# Patient Record
Sex: FEMALE | Race: WHITE | NOT HISPANIC OR LATINO | Employment: OTHER | URBAN - METROPOLITAN AREA
[De-identification: names, ages, dates, MRNs, and addresses within clinical notes are randomized per-mention and may not be internally consistent; named-entity substitution may affect disease eponyms.]

---

## 2017-01-11 ENCOUNTER — HOSPITAL ENCOUNTER (OUTPATIENT)
Dept: PHYSICAL THERAPY | Facility: MEDICAL CENTER | Age: 62
End: 2017-01-11
Attending: PHYSICAL MEDICINE & REHABILITATION
Payer: COMMERCIAL

## 2017-01-11 PROCEDURE — 97162 PT EVAL MOD COMPLEX 30 MIN: CPT

## 2017-01-11 PROCEDURE — 97140 MANUAL THERAPY 1/> REGIONS: CPT

## 2017-01-11 PROCEDURE — 97012 MECHANICAL TRACTION THERAPY: CPT

## 2017-01-16 ENCOUNTER — HOSPITAL ENCOUNTER (OUTPATIENT)
Dept: PHYSICAL THERAPY | Facility: MEDICAL CENTER | Age: 62
End: 2017-01-16
Attending: PHYSICAL MEDICINE & REHABILITATION
Payer: COMMERCIAL

## 2017-01-16 PROCEDURE — 97140 MANUAL THERAPY 1/> REGIONS: CPT

## 2017-01-16 PROCEDURE — 97012 MECHANICAL TRACTION THERAPY: CPT

## 2017-01-16 PROCEDURE — 97110 THERAPEUTIC EXERCISES: CPT

## 2017-01-23 ENCOUNTER — HOSPITAL ENCOUNTER (OUTPATIENT)
Dept: PHYSICAL THERAPY | Facility: MEDICAL CENTER | Age: 62
End: 2017-01-23
Attending: PHYSICAL MEDICINE & REHABILITATION
Payer: COMMERCIAL

## 2017-01-23 PROCEDURE — 97140 MANUAL THERAPY 1/> REGIONS: CPT

## 2017-01-23 PROCEDURE — 97012 MECHANICAL TRACTION THERAPY: CPT

## 2017-01-23 PROCEDURE — 97110 THERAPEUTIC EXERCISES: CPT

## 2017-01-25 ENCOUNTER — HOSPITAL ENCOUNTER (OUTPATIENT)
Dept: PHYSICAL THERAPY | Facility: MEDICAL CENTER | Age: 62
End: 2017-01-25
Attending: PHYSICAL MEDICINE & REHABILITATION
Payer: COMMERCIAL

## 2017-01-25 PROCEDURE — 97012 MECHANICAL TRACTION THERAPY: CPT

## 2017-01-25 PROCEDURE — 97110 THERAPEUTIC EXERCISES: CPT

## 2017-01-25 PROCEDURE — 97140 MANUAL THERAPY 1/> REGIONS: CPT

## 2017-01-30 ENCOUNTER — APPOINTMENT (OUTPATIENT)
Dept: PHYSICAL THERAPY | Facility: MEDICAL CENTER | Age: 62
End: 2017-01-30
Attending: PHYSICAL MEDICINE & REHABILITATION
Payer: COMMERCIAL

## 2017-02-01 ENCOUNTER — APPOINTMENT (OUTPATIENT)
Dept: PHYSICAL THERAPY | Facility: MEDICAL CENTER | Age: 62
End: 2017-02-01
Attending: PHYSICAL MEDICINE & REHABILITATION
Payer: COMMERCIAL

## 2017-02-06 ENCOUNTER — APPOINTMENT (OUTPATIENT)
Dept: PHYSICAL THERAPY | Facility: MEDICAL CENTER | Age: 62
End: 2017-02-06
Attending: PHYSICAL MEDICINE & REHABILITATION
Payer: COMMERCIAL

## 2017-05-23 PROBLEM — D23.71 OTHER BENIGN NEOPLASM OF SKIN OF RIGHT LOWER LIMB, INCLUDING HIP: Status: ACTIVE | Noted: 2017-05-23

## 2017-06-06 PROBLEM — D49.2 NEOPLASM OF UNSPECIFIED BEHAVIOR OF BONE, SOFT TISSUE, AND SKIN: Status: RESOLVED | Noted: 2017-05-23 | Resolved: 2017-06-06

## 2017-07-25 ENCOUNTER — RX ONLY (OUTPATIENT)
Age: 62
Setting detail: RX ONLY
End: 2017-07-25

## 2017-07-26 ENCOUNTER — RX ONLY (OUTPATIENT)
Age: 62
Setting detail: RX ONLY
End: 2017-07-26

## 2017-08-30 ENCOUNTER — HOSPITAL ENCOUNTER (OUTPATIENT)
Dept: RADIOLOGY | Facility: MEDICAL CENTER | Age: 62
End: 2017-08-30
Attending: FAMILY MEDICINE
Payer: COMMERCIAL

## 2017-08-30 DIAGNOSIS — Z13.820 SCREENING FOR OSTEOPOROSIS: ICD-10-CM

## 2017-08-30 PROCEDURE — G0202 SCR MAMMO BI INCL CAD: HCPCS

## 2019-06-25 ENCOUNTER — APPOINTMENT (RX ONLY)
Dept: URBAN - METROPOLITAN AREA CLINIC 4 | Facility: CLINIC | Age: 64
Setting detail: DERMATOLOGY
End: 2019-06-25

## 2019-06-25 DIAGNOSIS — L82.1 OTHER SEBORRHEIC KERATOSIS: ICD-10-CM

## 2019-06-25 DIAGNOSIS — L57.8 OTHER SKIN CHANGES DUE TO CHRONIC EXPOSURE TO NONIONIZING RADIATION: ICD-10-CM

## 2019-06-25 DIAGNOSIS — L81.4 OTHER MELANIN HYPERPIGMENTATION: ICD-10-CM

## 2019-06-25 DIAGNOSIS — D22 MELANOCYTIC NEVI: ICD-10-CM

## 2019-06-25 DIAGNOSIS — D18.0 HEMANGIOMA: ICD-10-CM

## 2019-06-25 DIAGNOSIS — L57.0 ACTINIC KERATOSIS: ICD-10-CM

## 2019-06-25 DIAGNOSIS — Z71.89 OTHER SPECIFIED COUNSELING: ICD-10-CM

## 2019-06-25 PROBLEM — D22.5 MELANOCYTIC NEVI OF TRUNK: Status: ACTIVE | Noted: 2019-06-25

## 2019-06-25 PROBLEM — D18.01 HEMANGIOMA OF SKIN AND SUBCUTANEOUS TISSUE: Status: ACTIVE | Noted: 2019-06-25

## 2019-06-25 PROCEDURE — ? MEDICATION COUNSELING

## 2019-06-25 PROCEDURE — 17004 DESTROY PREMAL LESIONS 15/>: CPT

## 2019-06-25 PROCEDURE — ? COUNSELING

## 2019-06-25 PROCEDURE — ? ADDITIONAL NOTES

## 2019-06-25 PROCEDURE — ? PRESCRIPTION

## 2019-06-25 PROCEDURE — 99397 PER PM REEVAL EST PAT 65+ YR: CPT | Mod: 25

## 2019-06-25 PROCEDURE — ? PHOTODYNAMIC THERAPY COUNSELING

## 2019-06-25 PROCEDURE — ? LIQUID NITROGEN

## 2019-06-25 RX ORDER — IMIQUIMOD 50 MG/G
CREAM TOPICAL
Qty: 12 | Refills: 2 | Status: ERX | COMMUNITY
Start: 2019-06-25

## 2019-06-25 RX ADMIN — IMIQUIMOD: 50 CREAM TOPICAL at 00:00

## 2019-06-25 ASSESSMENT — LOCATION DETAILED DESCRIPTION DERM
LOCATION DETAILED: STERNAL NOTCH
LOCATION DETAILED: LEFT ANTIHELIX
LOCATION DETAILED: LEFT ULNAR DORSAL HAND
LOCATION DETAILED: UPPER STERNUM
LOCATION DETAILED: RIGHT ANTIHELIX
LOCATION DETAILED: LEFT INFERIOR MEDIAL MIDBACK
LOCATION DETAILED: INFERIOR THORACIC SPINE
LOCATION DETAILED: RIGHT DORSAL WRIST
LOCATION DETAILED: LEFT DORSAL MIDDLE METACARPOPHALANGEAL JOINT
LOCATION DETAILED: RIGHT PROXIMAL RADIAL DORSAL FOREARM
LOCATION DETAILED: LEFT ANTITRAGUS
LOCATION DETAILED: LEFT DISTAL DORSAL FOREARM
LOCATION DETAILED: RIGHT MEDIAL SUPERIOR CHEST
LOCATION DETAILED: RIGHT PROXIMAL DORSAL FOREARM
LOCATION DETAILED: LEFT CAVUM CONCHA
LOCATION DETAILED: RIGHT SUPERIOR HELIX
LOCATION DETAILED: RIGHT SUPERIOR MEDIAL MIDBACK
LOCATION DETAILED: RIGHT INFERIOR CENTRAL MALAR CHEEK
LOCATION DETAILED: RIGHT RADIAL DORSAL HAND
LOCATION DETAILED: LEFT PROXIMAL DORSAL FOREARM
LOCATION DETAILED: RIGHT DISTAL DORSAL FOREARM
LOCATION DETAILED: LEFT INFERIOR CENTRAL MALAR CHEEK
LOCATION DETAILED: LEFT RADIAL DORSAL HAND

## 2019-06-25 ASSESSMENT — LOCATION ZONE DERM
LOCATION ZONE: EAR
LOCATION ZONE: ARM
LOCATION ZONE: FACE
LOCATION ZONE: HAND
LOCATION ZONE: TRUNK

## 2019-06-25 ASSESSMENT — LOCATION SIMPLE DESCRIPTION DERM
LOCATION SIMPLE: LEFT CHEEK
LOCATION SIMPLE: UPPER BACK
LOCATION SIMPLE: LEFT LOWER BACK
LOCATION SIMPLE: RIGHT WRIST
LOCATION SIMPLE: RIGHT CHEEK
LOCATION SIMPLE: RIGHT LOWER BACK
LOCATION SIMPLE: LEFT EAR
LOCATION SIMPLE: RIGHT HAND
LOCATION SIMPLE: LEFT FOREARM
LOCATION SIMPLE: RIGHT FOREARM
LOCATION SIMPLE: RIGHT EAR
LOCATION SIMPLE: CHEST
LOCATION SIMPLE: LEFT HAND

## 2019-06-25 NOTE — PROCEDURE: LIQUID NITROGEN
Render Note In Bullet Format When Appropriate: No
Consent: The patient's consent was obtained including but not limited to risks of crusting, scabbing, blistering, scarring, darker or lighter pigmentary change, recurrence, incomplete removal and infection.
Post-Care Instructions: I reviewed with the patient in detail post-care instructions. Patient is to wear sunprotection, and avoid picking at any of the treated lesions. Pt may apply Vaseline  or Aquaphor to crusted or scabbing areas.
Detail Level: Detailed
Duration Of Freeze Thaw-Cycle (Seconds): 0

## 2019-06-25 NOTE — PROCEDURE: MEDICATION COUNSELING
Cimzia Pregnancy And Lactation Text: This medication crosses the placenta but can be considered safe in certain situations. Cimzia may be excreted in breast milk.
5-Fu Counseling: 5-Fluorouracil Counseling:  I discussed with the patient the risks of 5-fluorouracil including but not limited to erythema, scaling, itching, weeping, crusting, and pain.
Ketoconazole Pregnancy And Lactation Text: This medication is Pregnancy Category C and it isn't know if it is safe during pregnancy. It is also excreted in breast milk and breast feeding isn't recommended.
Methotrexate Counseling:  Patient counseled regarding adverse effects of methotrexate including but not limited to nausea, vomiting, abnormalities in liver function tests. Patients may develop mouth sores, rash, diarrhea, and abnormalities in blood counts. The patient understands that monitoring is required including LFT's and blood counts.  There is a rare possibility of scarring of the liver and lung problems that can occur when taking methotrexate. Persistent nausea, loss of appetite, pale stools, dark urine, cough, and shortness of breath should be reported immediately. Patient advised to discontinue methotrexate treatment at least three months before attempting to become pregnant.  I discussed the need for folate supplements while taking methotrexate.  These supplements can decrease side effects during methotrexate treatment. The patient verbalized understanding of the proper use and possible adverse effects of methotrexate.  All of the patient's questions and concerns were addressed.
Stelara Pregnancy And Lactation Text: This medication is Pregnancy Category B and is considered safe during pregnancy. It is unknown if this medication is excreted in breast milk.
Spironolactone Counseling: Patient advised regarding risks of diarrhea, abdominal pain, hyperkalemia, birth defects (for female patients), liver toxicity and renal toxicity. The patient may need blood work to monitor liver and kidney function and potassium levels while on therapy. The patient verbalized understanding of the proper use and possible adverse effects of spironolactone.  All of the patient's questions and concerns were addressed.
Rhofade Counseling: Rhofade is a topical medication which can decrease superficial blood flow where applied. Side effects are uncommon and include stinging, redness and allergic reactions.
Gabapentin Pregnancy And Lactation Text: This medication is Pregnancy Category C and isn't considered safe during pregnancy. It is excreted in breast milk.
Terbinafine Counseling: Patient counseling regarding adverse effects of terbinafine including but not limited to headache, diarrhea, rash, upset stomach, liver function test abnormalities, itching, taste/smell disturbance, nausea, abdominal pain, and flatulence.  There is a rare possibility of liver failure that can occur when taking terbinafine.  The patient understands that a baseline LFT and kidney function test may be required. The patient verbalized understanding of the proper use and possible adverse effects of terbinafine.  All of the patient's questions and concerns were addressed.
Cosentyx Counseling:  I discussed with the patient the risks of Cosentyx including but not limited to worsening of Crohn's disease, immunosuppression, allergic reactions and infections.  The patient understands that monitoring is required including a PPD at baseline and must alert us or the primary physician if symptoms of infection or other concerning signs are noted.
Clindamycin Pregnancy And Lactation Text: This medication can be used in pregnancy if certain situations. Clindamycin is also present in breast milk.
5-Fu Pregnancy And Lactation Text: This medication is Pregnancy Category X and contraindicated in pregnancy and in women who may become pregnant. It is unknown if this medication is excreted in breast milk.
Methotrexate Pregnancy And Lactation Text: This medication is Pregnancy Category X and is known to cause fetal harm. This medication is excreted in breast milk.
Griseofulvin Pregnancy And Lactation Text: This medication is Pregnancy Category X and is known to cause serious birth defects. It is unknown if this medication is excreted in breast milk but breast feeding should be avoided.
Doxycycline Counseling:  Patient counseled regarding possible photosensitivity and increased risk for sunburn.  Patient instructed to avoid sunlight, if possible.  When exposed to sunlight, patients should wear protective clothing, sunglasses, and sunscreen.  The patient was instructed to call the office immediately if the following severe adverse effects occur:  hearing changes, easy bruising/bleeding, severe headache, or vision changes.  The patient verbalized understanding of the proper use and possible adverse effects of doxycycline.  All of the patient's questions and concerns were addressed.
Prednisone Counseling:  I discussed with the patient the risks of prolonged use of prednisone including but not limited to weight gain, insomnia, osteoporosis, mood changes, diabetes, susceptibility to infection, glaucoma and high blood pressure.  In cases where prednisone use is prolonged, patients should be monitored with blood pressure checks, serum glucose levels and an eye exam.  Additionally, the patient may need to be placed on GI prophylaxis, PCP prophylaxis, and calcium and vitamin D supplementation and/or a bisphosphonate.  The patient verbalized understanding of the proper use and the possible adverse effects of prednisone.  All of the patient's questions and concerns were addressed.
Arava Counseling:  Patient counseled regarding adverse effects of Arava including but not limited to nausea, vomiting, abnormalities in liver function tests. Patients may develop mouth sores, rash, diarrhea, and abnormalities in blood counts. The patient understands that monitoring is required including LFTs and blood counts.  There is a rare possibility of scarring of the liver and lung problems that can occur when taking methotrexate. Persistent nausea, loss of appetite, pale stools, dark urine, cough, and shortness of breath should be reported immediately. Patient advised to discontinue Arava treatment and consult with a physician prior to attempting conception. The patient will have to undergo a treatment to eliminate Arava from the body prior to conception.
Rhofade Pregnancy And Lactation Text: This medication has not been assigned a Pregnancy Risk Category. It is unknown if the medication is excreted in breast milk.
Taltz Counseling: I discussed with the patient the risks of ixekizumab including but not limited to immunosuppression, serious infections, worsening of inflammatory bowel disease and drug reactions.  The patient understands that monitoring is required including a PPD at baseline and must alert us or the primary physician if symptoms of infection or other concerning signs are noted.
Terbinafine Pregnancy And Lactation Text: This medication is Pregnancy Category B and is considered safe during pregnancy. It is also excreted in breast milk and breast feeding isn't recommended.
Spironolactone Pregnancy And Lactation Text: This medication can cause feminization of the male fetus and should be avoided during pregnancy. The active metabolite is also found in breast milk.
Itraconazole Counseling:  I discussed with the patient the risks of itraconazole including but not limited to liver damage, nausea/vomiting, neuropathy, and severe allergy.  The patient understands that this medication is best absorbed when taken with acidic beverages such as non-diet cola or ginger ale.  The patient understands that monitoring is required including baseline LFTs and repeat LFTs at intervals.  The patient understands that they are to contact us or the primary physician if concerning signs are noted.
Drysol Counseling:  I discussed with the patient the risks of drysol/aluminum chloride including but not limited to skin rash, itching, irritation, burning.
Glycopyrrolate Counseling:  I discussed with the patient the risks of glycopyrrolate including but not limited to skin rash, drowsiness, dry mouth, difficulty urinating, and blurred vision.
Arava Pregnancy And Lactation Text: This medication is Pregnancy Category X and is absolutely contraindicated during pregnancy. It is unknown if it is excreted in breast milk.
Prednisone Pregnancy And Lactation Text: This medication is Pregnancy Category C and it isn't know if it is safe during pregnancy. This medication is excreted in breast milk.
SSKI Counseling:  I discussed with the patient the risks of SSKI including but not limited to thyroid abnormalities, metallic taste, GI upset, fever, headache, acne, arthralgias, paraesthesias, lymphadenopathy, easy bleeding, arrhythmias, and allergic reaction.
Doxycycline Pregnancy And Lactation Text: This medication is Pregnancy Category D and not consider safe during pregnancy. It is also excreted in breast milk but is considered safe for shorter treatment courses.
Solaraze Counseling:  I discussed with the patient the risks of Solaraze including but not limited to erythema, scaling, itching, weeping, crusting, and pain.
Taltz Pregnancy And Lactation Text: The risk during pregnancy and breastfeeding is uncertain with this medication.
Dupixent Counseling: I discussed with the patient the risks of dupilumab including but not limited to eye infection and irritation, cold sores, injection site reactions, worsening of asthma, allergic reactions and increased risk of parasitic infection.  Live vaccines should be avoided while taking dupilumab. Dupilumab will also interact with certain medications such as warfarin and cyclosporine. The patient understands that monitoring is required and they must alert us or the primary physician if symptoms of infection or other concerning signs are noted.
Glycopyrrolate Pregnancy And Lactation Text: This medication is Pregnancy Category B and is considered safe during pregnancy. It is unknown if it is excreted breast milk.
Drysol Pregnancy And Lactation Text: This medication is considered safe during pregnancy and breast feeding.
Solaraze Pregnancy And Lactation Text: This medication is Pregnancy Category B and is considered safe. There is some data to suggest avoiding during the third trimester. It is unknown if this medication is excreted in breast milk.
Sski Pregnancy And Lactation Text: This medication is Pregnancy Category D and isn't considered safe during pregnancy. It is excreted in breast milk.
Tremfya Counseling: I discussed with the patient the risks of guselkumab including but not limited to immunosuppression, serious infections, worsening of inflammatory bowel disease and drug reactions.  The patient understands that monitoring is required including a PPD at baseline and must alert us or the primary physician if symptoms of infection or other concerning signs are noted.
Cimetidine Counseling:  I discussed with the patient the risks of Cimetidine including but not limited to gynecomastia, headache, diarrhea, nausea, drowsiness, arrhythmias, pancreatitis, skin rashes, psychosis, bone marrow suppression and kidney toxicity.
Dupixent Pregnancy And Lactation Text: This medication likely crosses the placenta but the risk for the fetus is uncertain. This medication is excreted in breast milk.
Erythromycin Counseling:  I discussed with the patient the risks of erythromycin including but not limited to GI upset, allergic reaction, drug rash, diarrhea, increase in liver enzymes, and yeast infections.
Elidel Counseling: Patient may experience a mild burning sensation during topical application. Elidel is not approved in children less than 2 years of age. There have been case reports of hematologic and skin malignancies in patients using topical calcineurin inhibitors although causality is questionable.
Hydroxychloroquine Counseling:  I discussed with the patient that a baseline ophthalmologic exam is needed at the start of therapy and every year thereafter while on therapy. A CBC may also be warranted for monitoring.  The side effects of this medication were discussed with the patient, including but not limited to agranulocytosis, aplastic anemia, seizures, rashes, retinopathy, and liver toxicity. Patient instructed to call the office should any adverse effect occur.  The patient verbalized understanding of the proper use and possible adverse effects of Plaquenil.  All the patient's questions and concerns were addressed.
Clofazimine Counseling:  I discussed with the patient the risks of clofazimine including but not limited to skin and eye pigmentation, liver damage, nausea/vomiting, gastrointestinal bleeding and allergy.
Erythromycin Pregnancy And Lactation Text: This medication is Pregnancy Category B and is considered safe during pregnancy. It is also excreted in breast milk.
Thalidomide Counseling: I discussed with the patient the risks of thalidomide including but not limited to birth defects, anxiety, weakness, chest pain, dizziness, cough and severe allergy.
Acitretin Counseling:  I discussed with the patient the risks of acitretin including but not limited to hair loss, dry lips/skin/eyes, liver damage, hyperlipidemia, depression/suicidal ideation, photosensitivity.  Serious rare side effects can include but are not limited to pancreatitis, pseudotumor cerebri, bony changes, clot formation/stroke/heart attack.  Patient understands that alcohol is contraindicated since it can result in liver toxicity and significantly prolong the elimination of the drug by many years.
Topical Retinoid counseling:  Patient advised to apply a pea-sized amount only at bedtime and wait 30 minutes after washing their face before applying.  If too drying, patient may add a non-comedogenic moisturizer. The patient verbalized understanding of the proper use and possible adverse effects of retinoids.  All of the patient's questions and concerns were addressed.
Enbrel Counseling:  I discussed with the patient the risks of etanercept including but not limited to myelosuppression, immunosuppression, autoimmune hepatitis, demyelinating diseases, lymphoma, and infections.  The patient understands that monitoring is required including a PPD at baseline and must alert us or the primary physician if symptoms of infection or other concerning signs are noted.
Elidel Pregnancy And Lactation Text: This medication is Pregnancy Category C. It is unknown if this medication is excreted in breast milk.
Hydroxychloroquine Pregnancy And Lactation Text: This medication has been shown to cause fetal harm but it isn't assigned a Pregnancy Risk Category. There are small amounts excreted in breast milk.
Colchicine Counseling:  Patient counseled regarding adverse effects including but not limited to stomach upset (nausea, vomiting, stomach pain, or diarrhea).  Patient instructed to limit alcohol consumption while taking this medication.  Colchicine may reduce blood counts especially with prolonged use.  The patient understands that monitoring of kidney function and blood counts may be required, especially at baseline. The patient verbalized understanding of the proper use and possible adverse effects of colchicine.  All of the patient's questions and concerns were addressed.
Acitretin Pregnancy And Lactation Text: This medication is Pregnancy Category X and should not be given to women who are pregnant or may become pregnant in the future. This medication is excreted in breast milk.
Doxepin Counseling:  Patient advised that the medication is sedating and not to drive a car after taking this medication. Patient informed of potential adverse effects including but not limited to dry mouth, urinary retention, and blurry vision.  The patient verbalized understanding of the proper use and possible adverse effects of doxepin.  All of the patient's questions and concerns were addressed.
Metronidazole Counseling:  I discussed with the patient the risks of metronidazole including but not limited to seizures, nausea/vomiting, a metallic taste in the mouth, nausea/vomiting and severe allergy.
Xeljanz Counseling: I discussed with the patient the risks of Xeljanz therapy including increased risk of infection, liver issues, headache, diarrhea, or cold symptoms. Live vaccines should be avoided. They were instructed to call if they have any problems.
Niacinamide Counseling: I recommended taking niacin or niacinamide, also know as vitamin B3, twice daily. Recent evidence suggests that taking vitamin B3 (500 mg twice daily) can reduce the risk of actinic keratoses and non-melanoma skin cancers. Side effects of vitamin B3 include flushing and headache.
Eucrisa Counseling: Patient may experience a mild burning sensation during topical application. Eucrisa is not approved in children less than 2 years of age.
Detail Level: Zone
Valtrex Counseling: I discussed with the patient the risks of valacyclovir including but not limited to kidney damage, nausea, vomiting and severe allergy.  The patient understands that if the infection seems to be worsening or is not improving, they are to call.
Eucrisa Pregnancy And Lactation Text: This medication has not been assigned a Pregnancy Risk Category but animal studies failed to show danger with the topical medication. It is unknown if the medication is excreted in breast milk.
Metronidazole Pregnancy And Lactation Text: This medication is Pregnancy Category B and considered safe during pregnancy.  It is also excreted in breast milk.
Doxepin Pregnancy And Lactation Text: This medication is Pregnancy Category C and it isn't known if it is safe during pregnancy. It is also excreted in breast milk and breast feeding isn't recommended.
Humira Counseling:  I discussed with the patient the risks of adalimumab including but not limited to myelosuppression, immunosuppression, autoimmune hepatitis, demyelinating diseases, lymphoma, and serious infections.  The patient understands that monitoring is required including a PPD at baseline and must alert us or the primary physician if symptoms of infection or other concerning signs are noted.
Niacinamide Pregnancy And Lactation Text: These medications are considered safe during pregnancy.
Bexarotene Counseling:  I discussed with the patient the risks of bexarotene including but not limited to hair loss, dry lips/skin/eyes, liver abnormalities, hyperlipidemia, pancreatitis, depression/suicidal ideation, photosensitivity, drug rash/allergic reactions, hypothyroidism, anemia, leukopenia, infection, cataracts, and teratogenicity.  Patient understands that they will need regular blood tests to check lipid profile, liver function tests, white blood cell count, thyroid function tests and pregnancy test if applicable.
Xelphyllisz Pregnancy And Lactation Text: This medication is Pregnancy Category D and is not considered safe during pregnancy.  The risk during breast feeding is also uncertain.
Tazorac Counseling:  Patient advised that medication is irritating and drying.  Patient may need to apply sparingly and wash off after an hour before eventually leaving it on overnight.  The patient verbalized understanding of the proper use and possible adverse effects of tazorac.  All of the patient's questions and concerns were addressed.
Valtrex Pregnancy And Lactation Text: this medication is Pregnancy Category B and is considered safe during pregnancy. This medication is not directly found in breast milk but it's metabolite acyclovir is present.
Bexarotene Pregnancy And Lactation Text: This medication is Pregnancy Category X and should not be given to women who are pregnant or may become pregnant. This medication should not be used if you are breast feeding.
Nsaids Counseling: NSAID Counseling: I discussed with the patient that NSAIDs should be taken with food. Prolonged use of NSAIDs can result in the development of stomach ulcers.  Patient advised to stop taking NSAIDs if abdominal pain occurs.  The patient verbalized understanding of the proper use and possible adverse effects of NSAIDs.  All of the patient's questions and concerns were addressed.
Minocycline Counseling: Patient advised regarding possible photosensitivity and discoloration of the teeth, skin, lips, tongue and gums.  Patient instructed to avoid sunlight, if possible.  When exposed to sunlight, patients should wear protective clothing, sunglasses, and sunscreen.  The patient was instructed to call the office immediately if the following severe adverse effects occur:  hearing changes, easy bruising/bleeding, severe headache, or vision changes.  The patient verbalized understanding of the proper use and possible adverse effects of minocycline.  All of the patient's questions and concerns were addressed.
Dapsone Counseling: I discussed with the patient the risks of dapsone including but not limited to hemolytic anemia, agranulocytosis, rashes, methemoglobinemia, kidney failure, peripheral neuropathy, headaches, GI upset, and liver toxicity.  Patients who start dapsone require monitoring including baseline LFTs and weekly CBCs for the first month, then every month thereafter.  The patient verbalized understanding of the proper use and possible adverse effects of dapsone.  All of the patient's questions and concerns were addressed.
Hydroxyzine Counseling: Patient advised that the medication is sedating and not to drive a car after taking this medication.  Patient informed of potential adverse effects including but not limited to dry mouth, urinary retention, and blurry vision.  The patient verbalized understanding of the proper use and possible adverse effects of hydroxyzine.  All of the patient's questions and concerns were addressed.
Xolair Counseling:  Patient informed of potential adverse effects including but not limited to fever, muscle aches, rash and allergic reactions.  The patient verbalized understanding of the proper use and possible adverse effects of Xolair.  All of the patient's questions and concerns were addressed.
Tazorac Pregnancy And Lactation Text: This medication is not safe during pregnancy. It is unknown if this medication is excreted in breast milk.
Hydroquinone Counseling:  Patient advised that medication may result in skin irritation, lightening (hypopigmentation), dryness, and burning.  In the event of skin irritation, the patient was advised to reduce the amount of the drug applied or use it less frequently.  Rarely, spots that are treated with hydroquinone can become darker (pseudoochronosis).  Should this occur, patient instructed to stop medication and call the office. The patient verbalized understanding of the proper use and possible adverse effects of hydroquinone.  All of the patient's questions and concerns were addressed.
Include Pregnancy/Lactation Warning?: No
Dapsone Pregnancy And Lactation Text: This medication is Pregnancy Category C and is not considered safe during pregnancy or breast feeding.
Isotretinoin Counseling: Patient should get monthly blood tests, not donate blood, not drive at night if vision affected, not share medication, and not undergo elective surgery for 6 months after tx completed. Side effects reviewed, pt to contact office should one occur.
Hydroxyzine Pregnancy And Lactation Text: This medication is not safe during pregnancy and should not be taken. It is also excreted in breast milk and breast feeding isn't recommended.
Topical Clindamycin Counseling: Patient counseled that this medication may cause skin irritation or allergic reactions.  In the event of skin irritation, the patient was advised to reduce the amount of the drug applied or use it less frequently.   The patient verbalized understanding of the proper use and possible adverse effects of clindamycin.  All of the patient's questions and concerns were addressed.
Xolair Pregnancy And Lactation Text: This medication is Pregnancy Category B and is considered safe during pregnancy. This medication is excreted in breast milk.
Nsaids Pregnancy And Lactation Text: These medications are considered safe up to 30 weeks gestation. It is excreted in breast milk.
Ilumya Counseling: I discussed with the patient the risks of tildrakizumab including but not limited to immunosuppression, malignancy, posterior leukoencephalopathy syndrome, and serious infections.  The patient understands that monitoring is required including a PPD at baseline and must alert us or the primary physician if symptoms of infection or other concerning signs are noted.
Minocycline Pregnancy And Lactation Text: This medication is Pregnancy Category D and not consider safe during pregnancy. It is also excreted in breast milk.
Quinolones Counseling:  I discussed with the patient the risks of fluoroquinolones including but not limited to GI upset, allergic reaction, drug rash, diarrhea, dizziness, photosensitivity, yeast infections, liver function test abnormalities, tendonitis/tendon rupture.
Imiquimod Counseling:  I discussed with the patient the risks of imiquimod including but not limited to erythema, scaling, itching, weeping, crusting, and pain.  Patient understands that the inflammatory response to imiquimod is variable from person to person and was educated regarded proper titration schedule.  If flu-like symptoms develop, patient knows to discontinue the medication and contact us.
Odomzo Counseling- I discussed with the patient the risks of Odomzo including but not limited to nausea, vomiting, diarrhea, constipation, weight loss, changes in the sense of taste, decreased appetite, muscle spasms, and hair loss.  The patient verbalized understanding of the proper use and possible adverse effects of Odomzo.  All of the patient's questions and concerns were addressed.
Isotretinoin Pregnancy And Lactation Text: This medication is Pregnancy Category X and is considered extremely dangerous during pregnancy. It is unknown if it is excreted in breast milk.
Erivedge Counseling- I discussed with the patient the risks of Erivedge including but not limited to nausea, vomiting, diarrhea, constipation, weight loss, changes in the sense of taste, decreased appetite, muscle spasms, and hair loss.  The patient verbalized understanding of the proper use and possible adverse effects of Erivedge.  All of the patient's questions and concerns were addressed.
Topical Clindamycin Pregnancy And Lactation Text: This medication is Pregnancy Category B and is considered safe during pregnancy. It is unknown if it is excreted in breast milk.
Quinolones Pregnancy And Lactation Text: This medication is Pregnancy Category C and it isn't know if it is safe during pregnancy. It is also excreted in breast milk.
High Dose Vitamin A Counseling: Side effects reviewed, pt to contact office should one occur.
Albendazole Counseling:  I discussed with the patient the risks of albendazole including but not limited to cytopenia, kidney damage, nausea/vomiting and severe allergy.  The patient understands that this medication is being used in an off-label manner.
Azathioprine Counseling:  I discussed with the patient the risks of azathioprine including but not limited to myelosuppression, immunosuppression, hepatotoxicity, lymphoma, and infections.  The patient understands that monitoring is required including baseline LFTs, Creatinine, possible TPMP genotyping and weekly CBCs for the first month and then every 2 weeks thereafter.  The patient verbalized understanding of the proper use and possible adverse effects of azathioprine.  All of the patient's questions and concerns were addressed.
Infliximab Counseling:  I discussed with the patient the risks of infliximab including but not limited to myelosuppression, immunosuppression, autoimmune hepatitis, demyelinating diseases, lymphoma, and serious infections.  The patient understands that monitoring is required including a PPD at baseline and must alert us or the primary physician if symptoms of infection or other concerning signs are noted.
Topical Sulfur Applications Counseling: Topical Sulfur Counseling: Patient counseled that this medication may cause skin irritation or allergic reactions.  In the event of skin irritation, the patient was advised to reduce the amount of the drug applied or use it less frequently.   The patient verbalized understanding of the proper use and possible adverse effects of topical sulfur application.  All of the patient's questions and concerns were addressed.
High Dose Vitamin A Pregnancy And Lactation Text: High dose vitamin A therapy is contraindicated during pregnancy and breast feeding.
Albendazole Pregnancy And Lactation Text: This medication is Pregnancy Category C and it isn't known if it is safe during pregnancy. It is also excreted in breast milk.
Topical Sulfur Applications Pregnancy And Lactation Text: This medication is Pregnancy Category C and has an unknown safety profile during pregnancy. It is unknown if this topical medication is excreted in breast milk.
Azathioprine Pregnancy And Lactation Text: This medication is Pregnancy Category D and isn't considered safe during pregnancy. It is unknown if this medication is excreted in breast milk.
Otezla Counseling: The side effects of Otezla were discussed with the patient, including but not limited to worsening or new depression, weight loss, diarrhea, nausea, upper respiratory tract infection, and headache. Patient instructed to call the office should any adverse effect occur.  The patient verbalized understanding of the proper use and possible adverse effects of Otezla.  All the patient's questions and concerns were addressed.
Minoxidil Counseling: Minoxidil is a topical medication which can increase blood flow where it is applied. It is uncertain how this medication increases hair growth. Side effects are uncommon and include stinging and allergic reactions.
Rifampin Counseling: I discussed with the patient the risks of rifampin including but not limited to liver damage, kidney damage, red-orange body fluids, nausea/vomiting and severe allergy.
Azithromycin Counseling:  I discussed with the patient the risks of azithromycin including but not limited to GI upset, allergic reaction, drug rash, diarrhea, and yeast infections.
Rifampin Pregnancy And Lactation Text: This medication is Pregnancy Category C and it isn't know if it is safe during pregnancy. It is also excreted in breast milk and should not be used if you are breast feeding.
Rituxan Counseling:  I discussed with the patient the risks of Rituxan infusions. Side effects can include infusion reactions, severe drug rashes including mucocutaneous reactions, reactivation of latent hepatitis and other infections and rarely progressive multifocal leukoencephalopathy.  All of the patient's questions and concerns were addressed.
Ivermectin Counseling:  Patient instructed to take medication on an empty stomach with a full glass of water.  Patient informed of potential adverse effects including but not limited to nausea, diarrhea, dizziness, itching, and swelling of the extremities or lymph nodes.  The patient verbalized understanding of the proper use and possible adverse effects of ivermectin.  All of the patient's questions and concerns were addressed.
Otezla Pregnancy And Lactation Text: This medication is Pregnancy Category C and it isn't known if it is safe during pregnancy. It is unknown if it is excreted in breast milk.
Cellcept Counseling:  I discussed with the patient the risks of mycophenolate mofetil including but not limited to infection/immunosuppression, GI upset, hypokalemia, hypercholesterolemia, bone marrow suppression, lymphoproliferative disorders, malignancy, GI ulceration/bleed/perforation, colitis, interstitial lung disease, kidney failure, progressive multifocal leukoencephalopathy, and birth defects.  The patient understands that monitoring is required including a baseline creatinine and regular CBC testing. In addition, patient must alert us immediately if symptoms of infection or other concerning signs are noted.
Wartpeel Counseling:  I discussed with the patient the risks of Wartpeel including but not limited to erythema, scaling, itching, weeping, crusting, and pain.
Oxybutynin Counseling:  I discussed with the patient the risks of oxybutynin including but not limited to skin rash, drowsiness, dry mouth, difficulty urinating, and blurred vision.
Mirvaso Counseling: Mirvaso is a topical medication which can decrease superficial blood flow where applied. Side effects are uncommon and include stinging, redness and allergic reactions.
Rituxan Pregnancy And Lactation Text: This medication is Pregnancy Category C and it isn't know if it is safe during pregnancy. It is unknown if this medication is excreted in breast milk but similar antibodies are known to be excreted.
Azithromycin Pregnancy And Lactation Text: This medication is considered safe during pregnancy and is also secreted in breast milk.
Tetracycline Counseling: Patient counseled regarding possible photosensitivity and increased risk for sunburn.  Patient instructed to avoid sunlight, if possible.  When exposed to sunlight, patients should wear protective clothing, sunglasses, and sunscreen.  The patient was instructed to call the office immediately if the following severe adverse effects occur:  hearing changes, easy bruising/bleeding, severe headache, or vision changes.  The patient verbalized understanding of the proper use and possible adverse effects of tetracycline.  All of the patient's questions and concerns were addressed. Patient understands to avoid pregnancy while on therapy due to potential birth defects.
Bactrim Counseling:  I discussed with the patient the risks of sulfa antibiotics including but not limited to GI upset, allergic reaction, drug rash, diarrhea, dizziness, photosensitivity, and yeast infections.  Rarely, more serious reactions can occur including but not limited to aplastic anemia, agranulocytosis, methemoglobinemia, blood dyscrasias, liver or kidney failure, lung infiltrates or desquamative/blistering drug rashes.
Cyclophosphamide Counseling:  I discussed with the patient the risks of cyclophosphamide including but not limited to hair loss, hormonal abnormalities, decreased fertility, abdominal pain, diarrhea, nausea and vomiting, bone marrow suppression and infection. The patient understands that monitoring is required while taking this medication.
Zyclara Counseling:  I discussed with the patient the risks of imiquimod including but not limited to erythema, scaling, itching, weeping, crusting, and pain.  Patient understands that the inflammatory response to imiquimod is variable from person to person and was educated regarded proper titration schedule.  If flu-like symptoms develop, patient knows to discontinue the medication and contact us.
Siliq Counseling:  I discussed with the patient the risks of Siliq including but not limited to new or worsening depression, suicidal thoughts and behavior, immunosuppression, malignancy, posterior leukoencephalopathy syndrome, and serious infections.  The patient understands that monitoring is required including a PPD at baseline and must alert us or the primary physician if symptoms of infection or other concerning signs are noted. There is also a special program designed to monitor depression which is required with Siliq.
Benzoyl Peroxide Counseling: Patient counseled that medicine may cause skin irritation and bleach clothing.  In the event of skin irritation, the patient was advised to reduce the amount of the drug applied or use it less frequently.   The patient verbalized understanding of the proper use and possible adverse effects of benzoyl peroxide.  All of the patient's questions and concerns were addressed.
Bactrim Pregnancy And Lactation Text: This medication is Pregnancy Category D and is known to cause fetal risk.  It is also excreted in breast milk.
Picato Counseling:  I discussed with the patient the risks of Picato including but not limited to erythema, scaling, itching, weeping, crusting, and pain.
Propranolol Counseling:  I discussed with the patient the risks of propranolol including but not limited to low heart rate, low blood pressure, low blood sugar, restlessness and increased cold sensitivity. They should call the office if they experience any of these side effects.
Benzoyl Peroxide Pregnancy And Lactation Text: This medication is Pregnancy Category C. It is unknown if benzoyl peroxide is excreted in breast milk.
Cyclophosphamide Pregnancy And Lactation Text: This medication is Pregnancy Category D and it isn't considered safe during pregnancy. This medication is excreted in breast milk.
Propranolol Pregnancy And Lactation Text: This medication is Pregnancy Category C and it isn't known if it is safe during pregnancy. It is excreted in breast milk.
Simponi Counseling:  I discussed with the patient the risks of golimumab including but not limited to myelosuppression, immunosuppression, autoimmune hepatitis, demyelinating diseases, lymphoma, and serious infections.  The patient understands that monitoring is required including a PPD at baseline and must alert us or the primary physician if symptoms of infection or other concerning signs are noted.
Finasteride Male Counseling: Finasteride Counseling:  I discussed with the patient the risks of use of finasteride including but not limited to decreased libido, decreased ejaculate volume, gynecomastia, and depression. Women should not handle medication.  All of the patient's questions and concerns were addressed.
Cephalexin Counseling: I counseled the patient regarding use of cephalexin as an antibiotic for prophylactic and/or therapeutic purposes. Cephalexin (commonly prescribed under brand name Keflex) is a cephalosporin antibiotic which is active against numerous classes of bacteria, including most skin bacteria. Side effects may include nausea, diarrhea, gastrointestinal upset, rash, hives, yeast infections, and in rare cases, hepatitis, kidney disease, seizures, fever, confusion, neurologic symptoms, and others. Patients with severe allergies to penicillin medications are cautioned that there is about a 10% incidence of cross-reactivity with cephalosporins. When possible, patients with penicillin allergies should use alternatives to cephalosporins for antibiotic therapy.
Carac Counseling:  I discussed with the patient the risks of Carac including but not limited to erythema, scaling, itching, weeping, crusting, and pain.
Fluconazole Counseling:  Patient counseled regarding adverse effects of fluconazole including but not limited to headache, diarrhea, nausea, upset stomach, liver function test abnormalities, taste disturbance, and stomach pain.  There is a rare possibility of liver failure that can occur when taking fluconazole.  The patient understands that monitoring of LFTs and kidney function test may be required, especially at baseline. The patient verbalized understanding of the proper use and possible adverse effects of fluconazole.  All of the patient's questions and concerns were addressed.
Cyclosporine Counseling:  I discussed with the patient the risks of cyclosporine including but not limited to hypertension, gingival hyperplasia,myelosuppression, immunosuppression, liver damage, kidney damage, neurotoxicity, lymphoma, and serious infections. The patient understands that monitoring is required including baseline blood pressure, CBC, CMP, lipid panel and uric acid, and then 1-2 times monthly CMP and blood pressure.
Cephalexin Pregnancy And Lactation Text: This medication is Pregnancy Category B and considered safe during pregnancy.  It is also excreted in breast milk but can be used safely for shorter doses.
Finasteride Pregnancy And Lactation Text: This medication is absolutely contraindicated during pregnancy. It is unknown if it is excreted in breast milk.
Opioid Counseling: I discussed with the patient the potential side effects of opioids including but not limited to addiction, altered mental status, and depression. I stressed avoiding alcohol, benzodiazepines, muscle relaxants and sleep aids unless specifically okayed by a physician. The patient verbalized understanding of the proper use and possible adverse effects of opioids. All of the patient's questions and concerns were addressed. They were instructed to flush the remaining pills down the toilet if they did not need them for pain.
Protopic Counseling: Patient may experience a mild burning sensation during topical application. Protopic is not approved in children less than 2 years of age. There have been case reports of hematologic and skin malignancies in patients using topical calcineurin inhibitors although causality is questionable.
Ketoconazole Counseling:   Patient counseled regarding improving absorption with orange juice.  Adverse effects include but are not limited to breast enlargement, headache, diarrhea, nausea, upset stomach, liver function test abnormalities, taste disturbance, and stomach pain.  There is a rare possibility of liver failure that can occur when taking ketoconazole. The patient understands that monitoring of LFTs may be required, especially at baseline. The patient verbalized understanding of the proper use and possible adverse effects of ketoconazole.  All of the patient's questions and concerns were addressed.
Birth Control Pills Counseling: Birth Control Pill Counseling: I discussed with the patient the potential side effects of OCPs including but not limited to increased risk of stroke, heart attack, thrombophlebitis, deep venous thrombosis, hepatic adenomas, breast changes, GI upset, headaches, and depression.  The patient verbalized understanding of the proper use and possible adverse effects of OCPs. All of the patient's questions and concerns were addressed.
Cimzia Counseling:  I discussed with the patient the risks of Cimzia including but not limited to immunosuppression, allergic reactions and infections.  The patient understands that monitoring is required including a PPD at baseline and must alert us or the primary physician if symptoms of infection or other concerning signs are noted.
Clindamycin Counseling: I counseled the patient regarding use of clindamycin as an antibiotic for prophylactic and/or therapeutic purposes. Clindamycin is active against numerous classes of bacteria, including skin bacteria. Side effects may include nausea, diarrhea, gastrointestinal upset, rash, hives, yeast infections, and in rare cases, colitis.
Gabapentin Counseling: I discussed with the patient the risks of gabapentin including but not limited to dizziness, somnolence, fatigue and ataxia.
Griseofulvin Counseling:  I discussed with the patient the risks of griseofulvin including but not limited to photosensitivity, cytopenia, liver damage, nausea/vomiting and severe allergy.  The patient understands that this medication is best absorbed when taken with a fatty meal (e.g., ice cream or french fries).
Stelara Counseling:  I discussed with the patient the risks of ustekinumab including but not limited to immunosuppression, malignancy, posterior leukoencephalopathy syndrome, and serious infections.  The patient understands that monitoring is required including a PPD at baseline and must alert us or the primary physician if symptoms of infection or other concerning signs are noted.
Opioid Pregnancy And Lactation Text: These medications can lead to premature delivery and should be avoided during pregnancy. These medications are also present in breast milk in small amounts.
Birth Control Pills Pregnancy And Lactation Text: This medication should be avoided if pregnant and for the first 30 days post-partum.
Protopic Pregnancy And Lactation Text: This medication is Pregnancy Category C. It is unknown if this medication is excreted in breast milk when applied topically.

## 2019-06-25 NOTE — PROCEDURE: ADDITIONAL NOTES
Additional Notes: Patient would like refill of Imiquimod. \\nDiscussed using it as Field Treatment. \\nExplained treatment and risks in detail with patient. \\Esthelaso discussed PDT Treatment for patient to consider in the future.\\nWill initiate PA today.
Detail Level: Simple

## 2020-04-23 ENCOUNTER — APPOINTMENT (RX ONLY)
Dept: URBAN - METROPOLITAN AREA CLINIC 4 | Facility: CLINIC | Age: 65
Setting detail: DERMATOLOGY
End: 2020-04-23

## 2020-04-23 DIAGNOSIS — L57.0 ACTINIC KERATOSIS: ICD-10-CM

## 2020-04-23 DIAGNOSIS — L259 CONTACT DERMATITIS AND OTHER ECZEMA, UNSPECIFIED CAUSE: ICD-10-CM

## 2020-04-23 PROBLEM — L23.9 ALLERGIC CONTACT DERMATITIS, UNSPECIFIED CAUSE: Status: ACTIVE | Noted: 2020-04-23

## 2020-04-23 PROCEDURE — ? COUNSELING

## 2020-04-23 PROCEDURE — ? PRESCRIPTION

## 2020-04-23 PROCEDURE — 99213 OFFICE O/P EST LOW 20 MIN: CPT

## 2020-04-23 PROCEDURE — ? ADDITIONAL NOTES

## 2020-04-23 RX ORDER — TRIAMCINOLONE ACETONIDE 1 MG/G
CREAM TOPICAL BID
Qty: 1 | Refills: 0 | Status: ERX | COMMUNITY
Start: 2020-04-23

## 2020-04-23 RX ADMIN — TRIAMCINOLONE ACETONIDE 1: 1 CREAM TOPICAL at 00:00

## 2020-04-23 ASSESSMENT — LOCATION DETAILED DESCRIPTION DERM
LOCATION DETAILED: RIGHT RADIAL DORSAL HAND
LOCATION DETAILED: LEFT RADIAL DORSAL HAND

## 2020-04-23 ASSESSMENT — LOCATION SIMPLE DESCRIPTION DERM
LOCATION SIMPLE: LEFT HAND
LOCATION SIMPLE: RIGHT HAND

## 2020-04-23 ASSESSMENT — LOCATION ZONE DERM: LOCATION ZONE: HAND

## 2020-04-23 NOTE — HPI: SKIN LESION
Is This A New Presentation, Or A Follow-Up?: Skin Lesions
What Type Of Note Output Would You Prefer (Optional)?: Standard Output
How Severe Is Your Skin Lesion?: moderate
Has Your Skin Lesion Been Treated?: not been treated
Which Family Member (Optional)?: Birth mother

## 2020-05-07 ENCOUNTER — HOSPITAL ENCOUNTER (OUTPATIENT)
Dept: LAB | Facility: MEDICAL CENTER | Age: 65
End: 2020-05-07
Attending: FAMILY MEDICINE
Payer: MEDICARE

## 2020-05-07 LAB
ALBUMIN SERPL BCP-MCNC: 4.5 G/DL (ref 3.2–4.9)
ALBUMIN/GLOB SERPL: 1.6 G/DL
ALP SERPL-CCNC: 78 U/L (ref 30–99)
ALT SERPL-CCNC: 21 U/L (ref 2–50)
ANION GAP SERPL CALC-SCNC: 13 MMOL/L (ref 7–16)
APPEARANCE UR: CLEAR
AST SERPL-CCNC: 17 U/L (ref 12–45)
BACTERIA #/AREA URNS HPF: NEGATIVE /HPF
BASOPHILS # BLD AUTO: 2.5 % (ref 0–1.8)
BASOPHILS # BLD: 0.12 K/UL (ref 0–0.12)
BILIRUB SERPL-MCNC: 0.5 MG/DL (ref 0.1–1.5)
BILIRUB UR QL STRIP.AUTO: NEGATIVE
BUN SERPL-MCNC: 14 MG/DL (ref 8–22)
CALCIUM SERPL-MCNC: 10 MG/DL (ref 8.5–10.5)
CHLORIDE SERPL-SCNC: 103 MMOL/L (ref 96–112)
CHOLEST SERPL-MCNC: 232 MG/DL (ref 100–199)
CO2 SERPL-SCNC: 25 MMOL/L (ref 20–33)
COLOR UR: YELLOW
CREAT SERPL-MCNC: 0.74 MG/DL (ref 0.5–1.4)
EOSINOPHIL # BLD AUTO: 0.57 K/UL (ref 0–0.51)
EOSINOPHIL NFR BLD: 11.7 % (ref 0–6.9)
EPI CELLS #/AREA URNS HPF: NEGATIVE /HPF
ERYTHROCYTE [DISTWIDTH] IN BLOOD BY AUTOMATED COUNT: 46.5 FL (ref 35.9–50)
FASTING STATUS PATIENT QL REPORTED: NORMAL
GLOBULIN SER CALC-MCNC: 2.9 G/DL (ref 1.9–3.5)
GLUCOSE SERPL-MCNC: 98 MG/DL (ref 65–99)
GLUCOSE UR STRIP.AUTO-MCNC: NEGATIVE MG/DL
HCT VFR BLD AUTO: 46.4 % (ref 37–47)
HDLC SERPL-MCNC: 64 MG/DL
HGB BLD-MCNC: 15.6 G/DL (ref 12–16)
HYALINE CASTS #/AREA URNS LPF: ABNORMAL /LPF
IMM GRANULOCYTES # BLD AUTO: 0.01 K/UL (ref 0–0.11)
IMM GRANULOCYTES NFR BLD AUTO: 0.2 % (ref 0–0.9)
KETONES UR STRIP.AUTO-MCNC: NEGATIVE MG/DL
LDLC SERPL CALC-MCNC: 138 MG/DL
LEUKOCYTE ESTERASE UR QL STRIP.AUTO: ABNORMAL
LYMPHOCYTES # BLD AUTO: 2.22 K/UL (ref 1–4.8)
LYMPHOCYTES NFR BLD: 45.4 % (ref 22–41)
MCH RBC QN AUTO: 30.9 PG (ref 27–33)
MCHC RBC AUTO-ENTMCNC: 33.6 G/DL (ref 33.6–35)
MCV RBC AUTO: 91.9 FL (ref 81.4–97.8)
MICRO URNS: ABNORMAL
MONOCYTES # BLD AUTO: 0.45 K/UL (ref 0–0.85)
MONOCYTES NFR BLD AUTO: 9.2 % (ref 0–13.4)
NEUTROPHILS # BLD AUTO: 1.52 K/UL (ref 2–7.15)
NEUTROPHILS NFR BLD: 31 % (ref 44–72)
NITRITE UR QL STRIP.AUTO: NEGATIVE
NRBC # BLD AUTO: 0 K/UL
NRBC BLD-RTO: 0 /100 WBC
PH UR STRIP.AUTO: 5.5 [PH] (ref 5–8)
PLATELET # BLD AUTO: 241 K/UL (ref 164–446)
PMV BLD AUTO: 11.3 FL (ref 9–12.9)
POTASSIUM SERPL-SCNC: 5.1 MMOL/L (ref 3.6–5.5)
PROT SERPL-MCNC: 7.4 G/DL (ref 6–8.2)
PROT UR QL STRIP: NEGATIVE MG/DL
RBC # BLD AUTO: 5.05 M/UL (ref 4.2–5.4)
RBC # URNS HPF: ABNORMAL /HPF
RBC UR QL AUTO: NEGATIVE
SODIUM SERPL-SCNC: 141 MMOL/L (ref 135–145)
SP GR UR STRIP.AUTO: 1.02
TRIGL SERPL-MCNC: 149 MG/DL (ref 0–149)
UROBILINOGEN UR STRIP.AUTO-MCNC: 0.2 MG/DL
WBC # BLD AUTO: 4.9 K/UL (ref 4.8–10.8)
WBC #/AREA URNS HPF: ABNORMAL /HPF

## 2020-05-07 PROCEDURE — 85025 COMPLETE CBC W/AUTO DIFF WBC: CPT

## 2020-05-07 PROCEDURE — 36415 COLL VENOUS BLD VENIPUNCTURE: CPT

## 2020-05-07 PROCEDURE — 86769 SARS-COV-2 COVID-19 ANTIBODY: CPT

## 2020-05-07 PROCEDURE — 83036 HEMOGLOBIN GLYCOSYLATED A1C: CPT

## 2020-05-07 PROCEDURE — 81001 URINALYSIS AUTO W/SCOPE: CPT

## 2020-05-07 PROCEDURE — 80053 COMPREHEN METABOLIC PANEL: CPT

## 2020-05-07 PROCEDURE — 80061 LIPID PANEL: CPT

## 2020-05-08 LAB
EST. AVERAGE GLUCOSE BLD GHB EST-MCNC: 111 MG/DL
HBA1C MFR BLD: 5.5 % (ref 0–5.6)

## 2020-05-09 LAB — TEST NAME 95000: NORMAL

## 2020-05-19 ENCOUNTER — APPOINTMENT (RX ONLY)
Dept: URBAN - METROPOLITAN AREA CLINIC 4 | Facility: CLINIC | Age: 65
Setting detail: DERMATOLOGY
End: 2020-05-19

## 2020-05-19 DIAGNOSIS — L81.4 OTHER MELANIN HYPERPIGMENTATION: ICD-10-CM

## 2020-05-19 DIAGNOSIS — L259 CONTACT DERMATITIS AND OTHER ECZEMA, UNSPECIFIED CAUSE: ICD-10-CM

## 2020-05-19 DIAGNOSIS — L57.0 ACTINIC KERATOSIS: ICD-10-CM

## 2020-05-19 PROBLEM — L23.9 ALLERGIC CONTACT DERMATITIS, UNSPECIFIED CAUSE: Status: ACTIVE | Noted: 2020-05-19

## 2020-05-19 PROCEDURE — 17000 DESTRUCT PREMALG LESION: CPT

## 2020-05-19 PROCEDURE — 99213 OFFICE O/P EST LOW 20 MIN: CPT | Mod: 25

## 2020-05-19 PROCEDURE — ? ADDITIONAL NOTES

## 2020-05-19 PROCEDURE — ? LIQUID NITROGEN

## 2020-05-19 PROCEDURE — ? COUNSELING

## 2020-05-19 PROCEDURE — 17003 DESTRUCT PREMALG LES 2-14: CPT

## 2020-05-19 ASSESSMENT — LOCATION SIMPLE DESCRIPTION DERM
LOCATION SIMPLE: RIGHT HAND
LOCATION SIMPLE: RIGHT WRIST
LOCATION SIMPLE: LEFT HAND

## 2020-05-19 ASSESSMENT — LOCATION DETAILED DESCRIPTION DERM
LOCATION DETAILED: LEFT RADIAL DORSAL HAND
LOCATION DETAILED: RIGHT RADIAL DORSAL HAND
LOCATION DETAILED: RIGHT DORSAL WRIST
LOCATION DETAILED: LEFT DORSAL RING FINGER METACARPOPHALANGEAL JOINT
LOCATION DETAILED: LEFT DORSAL MIDDLE FINGER METACARPOPHALANGEAL JOINT

## 2020-05-19 ASSESSMENT — LOCATION ZONE DERM
LOCATION ZONE: ARM
LOCATION ZONE: HAND

## 2020-05-19 NOTE — PROCEDURE: LIQUID NITROGEN
Render Post-Care Instructions In Note?: no
Duration Of Freeze Thaw-Cycle (Seconds): 2
Post-Care Instructions: I reviewed with the patient in detail post-care instructions. Patient is to wear sunprotection, and avoid picking at any of the treated lesions. Pt may apply Vaseline to crusted or scabbing areas.
Consent: The patient's consent was obtained including but not limited to risks of crusting, scabbing, blistering, scarring, darker or lighter pigmentary change, recurrence, incomplete removal and infection.
Detail Level: Simple
Number Of Freeze-Thaw Cycles: 2 freeze-thaw cycles

## 2020-05-19 NOTE — PROCEDURE: MIPS QUALITY
Detail Level: Detailed
Quality 226: Preventive Care And Screening: Tobacco Use: Screening And Cessation Intervention: Patient screened for tobacco use and is an ex/non-smoker
Quality 111:Pneumonia Vaccination Status For Older Adults: Pneumococcal Vaccination not Administered or Previously Received, Reason not Otherwise Specified
Quality 130: Documentation Of Current Medications In The Medical Record: Current Medications Documented

## 2020-05-19 NOTE — PROCEDURE: ADDITIONAL NOTES
Additional Notes: Advised to use CeraVe or Cetaphil moisturizers. Since last visit, patient had stopped using dish soap on the hands and is now using cetaphil hand wash. Patient reports that triamcinolone cream and changing to cetaphil wash has made a “night and day” difference.
Detail Level: Simple

## 2020-07-21 ENCOUNTER — HOSPITAL ENCOUNTER (EMERGENCY)
Facility: MEDICAL CENTER | Age: 65
End: 2020-07-21
Attending: EMERGENCY MEDICINE
Payer: MEDICARE

## 2020-07-21 ENCOUNTER — TELEPHONE (OUTPATIENT)
Dept: VASCULAR LAB | Facility: MEDICAL CENTER | Age: 65
End: 2020-07-21

## 2020-07-21 VITALS
DIASTOLIC BLOOD PRESSURE: 86 MMHG | HEART RATE: 70 BPM | SYSTOLIC BLOOD PRESSURE: 113 MMHG | HEIGHT: 65 IN | TEMPERATURE: 98.5 F | WEIGHT: 167.77 LBS | OXYGEN SATURATION: 94 % | BODY MASS INDEX: 27.95 KG/M2 | RESPIRATION RATE: 20 BRPM

## 2020-07-21 DIAGNOSIS — I48.92 ATRIAL FLUTTER WITH RAPID VENTRICULAR RESPONSE (HCC): ICD-10-CM

## 2020-07-21 LAB
ALBUMIN SERPL BCP-MCNC: 4.6 G/DL (ref 3.2–4.9)
ALP SERPL-CCNC: 95 U/L (ref 30–99)
ALT SERPL-CCNC: 21 U/L (ref 2–50)
ANION GAP SERPL CALC-SCNC: 15 MMOL/L (ref 7–16)
AST SERPL-CCNC: 22 U/L (ref 12–45)
BASOPHILS # BLD AUTO: 1.3 % (ref 0–1.8)
BASOPHILS # BLD: 0.1 K/UL (ref 0–0.12)
BILIRUB CONJ SERPL-MCNC: <0.2 MG/DL (ref 0.1–0.5)
BILIRUB INDIRECT SERPL-MCNC: NORMAL MG/DL (ref 0–1)
BILIRUB SERPL-MCNC: 0.3 MG/DL (ref 0.1–1.5)
BUN SERPL-MCNC: 15 MG/DL (ref 8–22)
CALCIUM SERPL-MCNC: 9.7 MG/DL (ref 8.4–10.2)
CHLORIDE SERPL-SCNC: 103 MMOL/L (ref 96–112)
CO2 SERPL-SCNC: 21 MMOL/L (ref 20–33)
CREAT SERPL-MCNC: 0.64 MG/DL (ref 0.5–1.4)
EKG IMPRESSION: NORMAL
EKG IMPRESSION: NORMAL
EOSINOPHIL # BLD AUTO: 0.53 K/UL (ref 0–0.51)
EOSINOPHIL NFR BLD: 6.9 % (ref 0–6.9)
ERYTHROCYTE [DISTWIDTH] IN BLOOD BY AUTOMATED COUNT: 41.4 FL (ref 35.9–50)
GLUCOSE SERPL-MCNC: 119 MG/DL (ref 65–99)
HCT VFR BLD AUTO: 44.9 % (ref 37–47)
HGB BLD-MCNC: 15.7 G/DL (ref 12–16)
IMM GRANULOCYTES # BLD AUTO: 0.01 K/UL (ref 0–0.11)
IMM GRANULOCYTES NFR BLD AUTO: 0.1 % (ref 0–0.9)
LYMPHOCYTES # BLD AUTO: 3.73 K/UL (ref 1–4.8)
LYMPHOCYTES NFR BLD: 48.4 % (ref 22–41)
MCH RBC QN AUTO: 31.3 PG (ref 27–33)
MCHC RBC AUTO-ENTMCNC: 35 G/DL (ref 33.6–35)
MCV RBC AUTO: 89.4 FL (ref 81.4–97.8)
MONOCYTES # BLD AUTO: 0.61 K/UL (ref 0–0.85)
MONOCYTES NFR BLD AUTO: 7.9 % (ref 0–13.4)
NEUTROPHILS # BLD AUTO: 2.72 K/UL (ref 2–7.15)
NEUTROPHILS NFR BLD: 35.4 % (ref 44–72)
NRBC # BLD AUTO: 0 K/UL
NRBC BLD-RTO: 0 /100 WBC
PLATELET # BLD AUTO: 262 K/UL (ref 164–446)
PMV BLD AUTO: 9.8 FL (ref 9–12.9)
POTASSIUM SERPL-SCNC: 3.6 MMOL/L (ref 3.6–5.5)
PROT SERPL-MCNC: 7.5 G/DL (ref 6–8.2)
RBC # BLD AUTO: 5.02 M/UL (ref 4.2–5.4)
SODIUM SERPL-SCNC: 139 MMOL/L (ref 135–145)
TSH SERPL DL<=0.005 MIU/L-ACNC: 5.43 UIU/ML (ref 0.38–5.33)
WBC # BLD AUTO: 7.7 K/UL (ref 4.8–10.8)

## 2020-07-21 PROCEDURE — 80076 HEPATIC FUNCTION PANEL: CPT

## 2020-07-21 PROCEDURE — 84443 ASSAY THYROID STIM HORMONE: CPT

## 2020-07-21 PROCEDURE — 99284 EMERGENCY DEPT VISIT MOD MDM: CPT

## 2020-07-21 PROCEDURE — 80048 BASIC METABOLIC PNL TOTAL CA: CPT

## 2020-07-21 PROCEDURE — 36415 COLL VENOUS BLD VENIPUNCTURE: CPT

## 2020-07-21 PROCEDURE — 700105 HCHG RX REV CODE 258: Performed by: EMERGENCY MEDICINE

## 2020-07-21 PROCEDURE — 96365 THER/PROPH/DIAG IV INF INIT: CPT

## 2020-07-21 PROCEDURE — 93005 ELECTROCARDIOGRAM TRACING: CPT | Mod: XE | Performed by: EMERGENCY MEDICINE

## 2020-07-21 PROCEDURE — 700102 HCHG RX REV CODE 250 W/ 637 OVERRIDE(OP)

## 2020-07-21 PROCEDURE — 700111 HCHG RX REV CODE 636 W/ 250 OVERRIDE (IP): Performed by: EMERGENCY MEDICINE

## 2020-07-21 PROCEDURE — A9270 NON-COVERED ITEM OR SERVICE: HCPCS

## 2020-07-21 PROCEDURE — 85025 COMPLETE CBC W/AUTO DIFF WBC: CPT

## 2020-07-21 RX ORDER — PROCAINAMIDE HYDROCHLORIDE 100 MG/ML
1000 INJECTION INTRAMUSCULAR; INTRAVENOUS ONCE
Status: DISCONTINUED | OUTPATIENT
Start: 2020-07-21 | End: 2020-07-21

## 2020-07-21 RX ADMIN — PROCAINAMIDE HYDROCHLORIDE 1000 MG: 100 INJECTION, SOLUTION INTRAMUSCULAR; INTRAVENOUS at 01:30

## 2020-07-21 RX ADMIN — RIVAROXABAN 20 MG: 20 TABLET, FILM COATED ORAL at 01:43

## 2020-07-21 SDOH — HEALTH STABILITY: MENTAL HEALTH: HOW OFTEN DO YOU HAVE 6 OR MORE DRINKS ON ONE OCCASION?: NEVER

## 2020-07-21 SDOH — HEALTH STABILITY: MENTAL HEALTH: HOW MANY STANDARD DRINKS CONTAINING ALCOHOL DO YOU HAVE ON A TYPICAL DAY?: 1 OR 2

## 2020-07-21 SDOH — HEALTH STABILITY: MENTAL HEALTH: HOW OFTEN DO YOU HAVE A DRINK CONTAINING ALCOHOL?: 2-3 TIMES A WEEK

## 2020-07-21 ASSESSMENT — FIBROSIS 4 INDEX: FIB4 SCORE: 1.03

## 2020-07-21 NOTE — TELEPHONE ENCOUNTER
[facility]    Received anticoagulation referral.    PCP: Dr Epps  INS: [employer]  Preferred location: [facility] for pt to call clinic and establish care.     Aric Sánchez, PharmD

## 2020-07-21 NOTE — ED PROVIDER NOTES
ED Provider Note      Means of Arrival: Private vehicle  History obtained from: Patient      CHIEF COMPLAINT  Chief Complaint   Patient presents with   • Rapid Heart Beat       HPI  Marely Coon is a 65 y.o. female who presents with palpitations.  The patient reports that she went to bed in her normal state of health at approximate 1030 last night.  Approximate level a.m., she woke up with a short burst of coughing.  This resolved, however she had palpitations.  She is never had these palpitations before.  The palpitations are persistent and she continues to feel about this time.  She reports feeling some fatigue and some slight shortness of breath secondary to the palpitations.  She denies any chest pain, leg swelling, recent long travels, thyroid dysfunction, history of DVTs, abdominal pain, fevers, dysuria    REVIEW OF SYSTEMS  CONSTITUTIONAL:  No fever.  CARDIOVASCULAR: See HPI  RESPIRATORY:  No pleuritic chest pain.  GASTROINTESTINAL:  No abdominal pain.  GENITOURINARY:   No dysuria.  MUSCULOSKELETAL:  No arthralgia.  SKIN:  No rash or suspicious lesions.  NEUROLOGIC:   No headache.  ENDOCRINE:  No facial edema.  HEMATOLOGIC:  No abnormal bleeding.       See HPI for further details.   All other systems are negative.     PAST MEDICAL HISTORY  History reviewed. No pertinent past medical history.    FAMILY HISTORY  History reviewed. No pertinent family history.    SOCIAL HISTORY   reports that she has never smoked. She has never used smokeless tobacco. She reports current alcohol use. She reports that she does not use drugs.    SURGICAL HISTORY  Past Surgical History:   Procedure Laterality Date   • BOWEL RESECTION LAPAROSCOPIC  4/10/2009    Performed by DAVIDE HAYES at [facility]   • LYSIS ADHESIONS GENERAL  4/10/2009    Performed by DAVIDE HAYES at [facility]   • LAPAROSCOPY  4/10/2009    Performed by DAVIDE HAYES at [facility]       CURRENT MEDICATIONS  Home  "Medications     Reviewed by Benjamin Dillon R.N. (Registered Nurse) on 07/21/20 at 0055  Med List Status: Partial   Medication Last Dose Status        Patient Jorge Luis Taking any Medications                       ALLERGIES  Allergies   Allergen Reactions   • No Known Drug Allergy        PHYSICAL EXAM  VITAL SIGNS: /88   Pulse (!) 146   Temp 36.9 °C (98.5 °F) (Temporal)   Resp 20   Ht 1.651 m (5' 5\")   Wt 76.1 kg (167 lb 12.3 oz)   LMP 03/12/2008   SpO2 95%   BMI 27.92 kg/m²    Con: Comfortable  HENT: Normocephalic, atraumatic, Oropharynx within normal limits.  No thyromegaly  Eyes: Pupils equal, round and reactive to light, Extraocular muscles intact  Resp: Clear to auscultation, no wheezes, rales or crackles  CV: Tachycardic, regular Rate and Rhythm, Normal first and second heart sounds, no murmurs, rubs or gallups.  Abd: Soft, Nontender, Nondistended, no rebound or guarding.  : No costovertebral angle tenderness  MSK: No deformities no pedal edema  Skin: No rash, Warm and dry, No petechiae  Neuro: Speech fluent  Psych: Normal mood/mentation      RADIOLOGY/PROCEDURES  Procedure: Cardioversion  Indication: Atrial flutter  Informed consent was obtained, signed, placed in the chart.  This includes risks, benefits, alternatives.    Patient was placed on cardiac monitor, was given IV procainamide with good effect.  There was no QRS or QTC prolongation.  The patient cardioverted into sinus rhythm.    There were no immediate complications.  Patient tolerated the procedure well.    LABS  Labs Reviewed   CBC WITH DIFFERENTIAL - Abnormal; Notable for the following components:       Result Value    Neutrophils-Polys 35.40 (*)     Lymphocytes 48.40 (*)     Eos (Absolute) 0.53 (*)     All other components within normal limits   BASIC METABOLIC PANEL - Abnormal; Notable for the following components:    Glucose 119 (*)     All other components within normal limits   TSH - Abnormal; Notable for the following " components:    TSH 5.430 (*)     All other components within normal limits   ESTIMATED GFR   HEPATIC FUNCTION PANEL        EKG  Results for orders placed or performed during the hospital encounter of 20   EKG (NOW)   Result Value Ref Range    Report       [facility] Emergency Dept.    Test Date:  2020  Pt Name:    RAHTA HENRIQUEZ            Department: [facility]  MRN:        [id number]                      [facility]  Gender:     Female                       Technician:   :        1955                   Requested By:BIMAL LOMBARDO  Order #:    [id number]                    Reading MD: Bimal Lombardo    Measurements  Intervals                                Axis  Rate:       138                          P:  ID:                                      QRS:        -5  QRSD:       87                           T:          67  QT:         245  QTc:        371    Interpretive Statements  Atrial flutter with predominant 2:1 AV block  Borderline ST depression, anterolateral leads  Compared to ECG 04/10/2009 18:01:12  2:1 AV block now present  ST (T wave) deviation now present  Sinus rhythm no longer present  Electronically Signed On 2020 1:20:47 PDT by Bimal Lombardo     EKG (NOW)   Result Value Ref Range    Report       [facility] Emergency Dept.    Test Date:  2020  Pt Name:    RAHAT HENRIQUEZ            Department: E[facility]  MRN:        [id number]                      [facility]  Gender:     Female                       Technician: CORNEL  :        1955                   Requested By:BIMAL LOMBARDO  Order #:    [id number]                    Reading MD: Bimal Lombardo    Measurements  Intervals                                Axis  Rate:       70                           P:          21  ID:         [id number]                          QRS:        -41  QRSD:       88                           T:          -14  QT:         400  QTc:         432    Interpretive Statements  SINUS RHYTHM  LEFT ANTERIOR FASCICULAR BLOCK  BORDERLINE T ABNORMALITIES, INFERIOR LEADS  Compared to ECG 07/21/2020 00:26:23  Left anterior fascicular block now present  T-wave abnormality now present  Atrial flutter no longer present  2:1 AV block no longer present  ST (T wave) deviation no longer present   Electronically Signed On 7- 3:17:08 PDT by Aric Lombardo          COURSE & MEDICAL DECISION MAKING  Pertinent Labs & Imaging studies reviewed. (See chart for details)    Patient presents with new onset of atrial flutter.  She has a chads Vascor of 2, will start her on anticoagulation.  The patient symptoms have been onset for approximately 2 hours at the time of presentation.  Low risk for inducing stroke with rhythm control.  Rhythm versus rate control and chemical versus electrical cardioversion were discussed with the patient and her .  Using shared decision making, she agreed to attempt initial chemical cardioversion.  There was a delay in care as we do not have procainamide in our pharmacy at [facility] and it had to be sent via  from the main.  She has no stigmata of DVT/PE.  No chest pain to suggest ACS.  No evidence of anemia.  Patient demonstrates no signs of infection.    Patient does have mildly elevated TSH.  She was notified of this and advised to follow-up with her regular doctor.  Likely subclinical hypothyroidism, no indication for intervention at this time.  Patient spontaneously cardioverted.  Repeat EKG reassuring.  Patient will be started on low-dose metoprolol.  The ED  was contacted for cardiology follow-up.  Patient was provided with referral to anticoagulation services and cardiology.  Will add on hepatic panel for baseline measurements for Xarelto.  Patient was given return precautions, anticipatory guidance.  She was advised of the increased bleeding risk.  Patient is well-appearing at the time of discharge.    Critical care  time excluding other billable procedures: 33 minutes      Appropriate PPE were worn at this encounter.     FINAL IMPRESSION  1. Atrial flutter with rapid ventricular response (HCC)             DISPOSITION:  Patient will be discharged home in stable condition.    FOLLOW UP:  Yara Epps M.D.  [address]  [address]  [facility] [address]  [phone]    Schedule an appointment as soon as possible for a visit       Jorgito Bower M.D.  [address]  [address]  [facility] [id number] [phone]    Schedule an appointment as soon as possible for a visit         OUTPATIENT MEDICATIONS:  New Prescriptions    METOPROLOL (LOPRESSOR) 25 MG TAB    Take 0.5 Tabs by mouth 2 times a day.    RIVAROXABAN (XARELTO) 20 MG TAB TABLET    Take 1 Tab by mouth with dinner.

## 2020-07-21 NOTE — DISCHARGE INSTRUCTIONS
You are seen in the emergency department for a fast heart rate.  You were found to be in a rhythm called atrial flutter.  You were provided with a medication to fix this, and you have returned to normal sinus rhythm.    Given the increased risk of stroke, it is important that you take the Xarelto to prevent stroke.  Is important that you also follow-up with a cardiologist.  A message has been left with the  to help arrange for an appointment.  Please also follow-up with your regular doctor.    Your cardiologist will decide when to stop the blood thinner.    Blood thinners can make any trauma more dangerous, and is important that you are seen in the emergency department if you suffer any major injuries or blows to the head.      Your labs were reassuring, however you do have a slightly abnormal thyroid levels suggesting low thyroid.  This should be followed up with your regular doctor.    Please return to the emergency department or seek medical attention if you develop:  Trauma to the head, bloody stools, black stools, shortness of breath, palpitations, chest pain, lightheadedness, any other new or concerning findings    ================================  Coronavirus Information    Your visit did NOT appear to relate to coronavirus, but if you or your family develop symptoms that concern you for coronavirus (please see CDC website for symptoms), please contact the [facility] hotline (or your local health department)  or your healthcare provider before going to a medical facility:    [facility]  24hr hotline: [phone]    Information is available from the Centers for Disease Control and Prevention  www.CDC.gov    and     [facility]  [email or web address]    If you are severely ill or having a hard time breathing, please immediatly seek medical care. Notify the  or Emergency Department Triage about your symptoms.

## 2020-07-21 NOTE — ED NOTES
Patient is stable for discharge at this time, anticipatory guidance provided, close follow-up is encouraged, and ED return instructions have been detailed. Patient and family are both agreeable to the disposition and plan and discharged home in ambulatory state and in good condition;    Pt PCP information updated & Anticoagulation testing info provided; Pt &  both deny having any questions;

## 2020-07-23 ENCOUNTER — OFFICE VISIT (OUTPATIENT)
Dept: CARDIOLOGY | Facility: MEDICAL CENTER | Age: 65
End: 2020-07-23
Payer: MEDICARE

## 2020-07-23 ENCOUNTER — TELEPHONE (OUTPATIENT)
Dept: CARDIOLOGY | Facility: MEDICAL CENTER | Age: 65
End: 2020-07-23

## 2020-07-23 ENCOUNTER — APPOINTMENT (OUTPATIENT)
Dept: MEDICAL GROUP | Facility: MEDICAL CENTER | Age: 65
End: 2020-07-23
Payer: MEDICARE

## 2020-07-23 VITALS
SYSTOLIC BLOOD PRESSURE: 112 MMHG | HEART RATE: 61 BPM | HEIGHT: 65 IN | BODY MASS INDEX: 27.66 KG/M2 | OXYGEN SATURATION: 96 % | WEIGHT: 166 LBS | DIASTOLIC BLOOD PRESSURE: 82 MMHG

## 2020-07-23 DIAGNOSIS — I48.3 TYPICAL ATRIAL FLUTTER (HCC): ICD-10-CM

## 2020-07-23 PROCEDURE — 99205 OFFICE O/P NEW HI 60 MIN: CPT | Performed by: INTERNAL MEDICINE

## 2020-07-23 RX ORDER — CEFDINIR 300 MG/1
300 CAPSULE ORAL
COMMUNITY
Start: 2020-05-21 | End: 2023-01-18

## 2020-07-23 ASSESSMENT — FIBROSIS 4 INDEX: FIB4 SCORE: 1.23

## 2020-07-23 NOTE — PROGRESS NOTES
Arrhythmia Clinic Note (New patient)     DOS: 7/23/2020    Referring physician: Dr Epps    Chief complaint/Reason for consult: Flutter    HPI: 65-year-old female with no significant past medical history, awoke from sleep 3 nights ago with feeling of something caught in her throat and heart pounding.  She thought she might be having an anxiety attack so she tried breathing into a paper bag but this did not help.  She went to the emergency room about an hour later, and was found to be in atrial flutter with RVR.  She was started on an IV medication and eventually converted to sinus rhythm.  She was discharged on metoprolol and Xarelto.  She says ever since taking the metoprolol she feels funny and off.  She wants to stop taking this.  She would like to stop taking Xarelto at some point as well.  She has no prior history of cardiac conditions, no chest pain, syncope, shortness of breath on exertion.  She is concerned because she feels like she has been healthy and that she should not have this heart problem.    ROS (+ highlighted in bold):  Constitutional: Fevers/chills/fatigue/weightloss  HEENT: Blurry vision/eye pain/sore throat/hearing loss  Respiratory: Shortness of breath/cough  Cardiovascular: Chest pain/palpitations/edema/orthopnea/syncope  GI: Nausea/vomitting/diarrhea  MSK: Arthralgias/myagias/muscle weakness  Skin: Rash/sores  Neurological: Numbness/tremors/vertigo  Endocrine: Excessive thirst/polyuria/cold intolerance/heat intolerance  Psych: Depression/anxiety    No past medical history on file.    Past Surgical History:   Procedure Laterality Date   • BOWEL RESECTION LAPAROSCOPIC  4/10/2009    Performed by DAVIDE HAYES at [facility]   • LYSIS ADHESIONS GENERAL  4/10/2009    Performed by DAVIDE HAYES at [facility]   • LAPAROSCOPY  4/10/2009    Performed by DAVIDE HAYES at [facility]       Social History     Socioeconomic History   • Marital status:       Spouse name: Not on file   • Number of children: Not on file   • Years of education: Not on file   • Highest education level: Not on file   Occupational History   • Not on file   Social Needs   • Financial resource strain: Not on file   • Food insecurity     Worry: Not on file     Inability: Not on file   • Transportation needs     Medical: Not on file     Non-medical: Not on file   Tobacco Use   • Smoking status: Never Smoker   • Smokeless tobacco: Never Used   Substance and Sexual Activity   • Alcohol use: Yes     Frequency: 2-3 times a week     Drinks per session: 1 or 2     Binge frequency: Never     Comment: glass of wine at night   • Drug use: No   • Sexual activity: Not on file   Lifestyle   • Physical activity     Days per week: Not on file     Minutes per session: Not on file   • Stress: Not on file   Relationships   • Social connections     Talks on phone: Not on file     Gets together: Not on file     Attends Sikh service: Not on file     Active member of club or organization: Not on file     Attends meetings of clubs or organizations: Not on file     Relationship status: Not on file   • Intimate partner violence     Fear of current or ex partner: Not on file     Emotionally abused: Not on file     Physically abused: Not on file     Forced sexual activity: Not on file   Other Topics Concern   • Not on file   Social History Narrative   • Not on file       No family history on file.   Family history of her biological mother with a pacemaker, otherwise she was adopted so she does not know    Allergies   Allergen Reactions   • No Known Drug Allergy        Current Outpatient Medications   Medication Sig Dispense Refill   • rivaroxaban (XARELTO) 20 MG Tab tablet Take 1 Tab by mouth with dinner. 30 Tab 0   • metoprolol (LOPRESSOR) 25 MG Tab Take 0.5 Tabs by mouth 2 times a day. 30 Tab 0   • cefdinir (OMNICEF) 300 MG Cap Take 300 mg by mouth.       No current facility-administered medications for this  "visit.        Physical Exam:  Vitals:    07/23/20 0737   BP: 112/82   BP Location: Left arm   Patient Position: Sitting   BP Cuff Size: Adult   Pulse: 61   SpO2: 96%   Weight: 75.3 kg (166 lb)   Height: 1.651 m (5' 5\")     General appearance: NAD, conversant   Eyes: anicteric sclerae, moist conjunctivae; no lid-lag; PERRLA  HENT: Atraumatic; oropharynx clear with moist mucous membranes and no mucosal ulcerations; normal hard and soft palate  Neck: Trachea midline; FROM, supple, no thyromegaly or lymphadenopathy  Lungs: CTA, with normal respiratory effort and no intercostal retractions  CV: RRR, no MRGs, no JVD   Abdomen: Soft, non-tender; no masses or HSM  Extremities: No peripheral edema or extremity lymphadenopathy  Skin: Normal temperature, turgor and texture; no rash, ulcers or subcutaneous nodules  Psych: Appropriate affect, alert and oriented to person, place and time    Data:  Lipids:   Lab Results   Component Value Date/Time    CHOLSTRLTOT 232 (H) 05/07/2020 07:06 AM    TRIGLYCERIDE 149 05/07/2020 07:06 AM    HDL 64 05/07/2020 07:06 AM     (H) 05/07/2020 07:06 AM        BMP:  Lab Results   Component Value Date/Time    SODIUM 139 07/21/2020 0035    POTASSIUM 3.6 07/21/2020 0035    CHLORIDE 103 07/21/2020 0035    CO2 21 07/21/2020 0035    GLUCOSE 119 (H) 07/21/2020 0035    BUN 15 07/21/2020 0035    CREATININE 0.64 07/21/2020 0035    CALCIUM 9.7 07/21/2020 0035    ANION 15.0 07/21/2020 0035        TSH:   Lab Results   Component Value Date/Time    TSHULTRASEN 5.430 (H) 07/21/2020 0035        THYROXINE (T4):   No results found for: NABIL     CBC:   Lab Results   Component Value Date/Time    WBC 7.7 07/21/2020 12:35 AM    RBC 5.02 07/21/2020 12:35 AM    HEMOGLOBIN 15.7 07/21/2020 12:35 AM    HEMATOCRIT 44.9 07/21/2020 12:35 AM    MCV 89.4 07/21/2020 12:35 AM    MCH 31.3 07/21/2020 12:35 AM    MCHC 35.0 07/21/2020 12:35 AM    RDW 41.4 07/21/2020 12:35 AM    PLATELETCT 262 07/21/2020 12:35 AM    MPV 9.8 " 07/21/2020 12:35 AM    NEUTSPOLYS 35.40 (L) 07/21/2020 12:35 AM    LYMPHOCYTES 48.40 (H) 07/21/2020 12:35 AM    MONOCYTES 7.90 07/21/2020 12:35 AM    EOSINOPHILS 6.90 07/21/2020 12:35 AM    BASOPHILS 1.30 07/21/2020 12:35 AM    IMMGRAN 0.10 07/21/2020 12:35 AM    NRBC 0.00 07/21/2020 12:35 AM    NEUTS 2.72 07/21/2020 12:35 AM    LYMPHS 3.73 07/21/2020 12:35 AM    MONOS 0.61 07/21/2020 12:35 AM    EOS 0.53 (H) 07/21/2020 12:35 AM    BASO 0.10 07/21/2020 12:35 AM    IMMGRANAB 0.01 07/21/2020 12:35 AM    NRBCAB 0.00 07/21/2020 12:35 AM        CBC w/o DIFF  Lab Results   Component Value Date/Time    WBC 7.7 07/21/2020 12:35 AM    RBC 5.02 07/21/2020 12:35 AM    HEMOGLOBIN 15.7 07/21/2020 12:35 AM    MCV 89.4 07/21/2020 12:35 AM    MCH 31.3 07/21/2020 12:35 AM    MCHC 35.0 07/21/2020 12:35 AM    RDW 41.4 07/21/2020 12:35 AM    MPV 9.8 07/21/2020 12:35 AM     EKG interpreted by me: Typical atrial flutter with RVR    Impression/Plan:  1. Typical atrial flutter (HCC)  EC-ECHOCARDIOGRAM COMPLETE W/O CONT     1.  Atrial flutter with RVR.  Likely typical.    -We discussed at length her options for treatment including continued rate control, rhythm control with antiarrhythmics, and EP study with ablation.  Due to potential for curative procedure and safely coming off of all medications including anticoagulation, she would prefer the ablation procedure. Risks include vascular access bleeding or pain, infection, stroke, myocardial infarction, cardiac tamponade, pericardial effusion, myocardial perforation, major bleeding, phrenic nerve damage, heart block requiring a pacemaker, and death. Risk of major adverse event is ~1%. Success rates long term are 80-95% depending on the arrhythmia induced and the acute success in the EP lab.  She understands the risks and benefits of the procedure.  All questions were answered.  She says she is 99% sure that she wants to proceed with this.  She wants to go home and think about it however,  and will call to schedule.    She prefers to discontinue metoprolol at this time.  Continue Xarelto for now, plan atrial flutter ablation without LISE, can hold Xarelto 1 to 2 days prior to the procedure, unlikely that she will present in atrial flutter but if this happens we can add on LISE if needed.    Follow-up 6 months      Benjamin Valente MD  Cardiac Electrophysiology

## 2020-07-23 NOTE — TELEPHONE ENCOUNTER
Patient scheduled for flutter ablation on 10-19-20 at [facility] with Dr. Valente.

## 2020-07-24 ENCOUNTER — TELEPHONE (OUTPATIENT)
Dept: VASCULAR LAB | Facility: MEDICAL CENTER | Age: 65
End: 2020-07-24

## 2020-07-24 NOTE — TELEPHONE ENCOUNTER
Received anticoagulation referral for Xarelto due to Afib from Dr. Aric Lombardo on 07/21/20.    Pt no showed appt yesterday but may have been due to confusion?    Left msg for pt to call back.    Per Dr. Valente's note from yesterday -  Continue Xarelto for now, plan atrial flutter ablation without LISE, can hold Xarelto 1 to 2 days prior to the procedure, unlikely that she will present in atrial flutter but if this happens we can add on LISE if needed.    Insurance: Medicare  PCP: [facility]  Locations to be seen: [facility] at [phone], fax [phone]    Adele Correa, OlindaD

## 2020-07-24 NOTE — TELEPHONE ENCOUNTER
To [facility] - cryo vs reg ablation?     Called pt back, she had a lot of questions about the procedure including if [facility] should do a cryo ablation vs standard ablation. Pt read that it had better long term success rates. D/w her he likely chose his approach based on the type of arrhythmia.     1.She is concerned if her afl comes back post ablation what the plan is.   2.She is also wondering if she would be a better candidate for a cryo ablation

## 2020-07-27 NOTE — TELEPHONE ENCOUNTER
Pt updated via [employer].    Benjamin Valente M.D.  You; Katelin Go, [facility] Ass't Yesterday (10:31 AM)      Please advise pt     - Re Cryoballoon ablation, this is done for atrial fibrillation, not atrial flutter. She has atrial flutter, so she would receive RF ablation, which is the standard of care and most efficacious modality of ablation.   - It is rare for atrial flutter to recur after ablation, <5%. For the rare patient where this happens, we most often proceed to a second ablation. The vast majority of patients with typical atrial flutter do not have recurrence.     Please let pt know if she has other questions I'd be happy to answer, but that if she would feel better seeing me in clinic again before scheduling an ablation, we can do that too.     [facility]    Message text

## 2020-07-29 ENCOUNTER — TELEPHONE (OUTPATIENT)
Dept: VASCULAR LAB | Facility: MEDICAL CENTER | Age: 65
End: 2020-07-29

## 2020-07-29 NOTE — TELEPHONE ENCOUNTER
Received anticoagulation referral for Xarelto due to Afib from Dr. Aric Lombardo on 07/21/20.     Pt declined to establish care.     Per Dr. Valente's note from yesterday -  Continue Xarelto for now, plan atrial flutter ablation without LISE, can hold Xarelto 1 to 2 days prior to the procedure, unlikely that she will present in atrial flutter but if this happens we can add on LISE if needed.     Insurance: Medicare  PCP: [facility]  Locations to be seen: [facility] at [phone], fax [phone]     Adele Correa, OlindaD

## 2020-08-10 ENCOUNTER — APPOINTMENT (RX ONLY)
Dept: URBAN - METROPOLITAN AREA CLINIC 4 | Facility: CLINIC | Age: 65
Setting detail: DERMATOLOGY
End: 2020-08-10

## 2020-08-10 DIAGNOSIS — D18.0 HEMANGIOMA: ICD-10-CM

## 2020-08-10 DIAGNOSIS — L82.1 OTHER SEBORRHEIC KERATOSIS: ICD-10-CM

## 2020-08-10 DIAGNOSIS — L81.4 OTHER MELANIN HYPERPIGMENTATION: ICD-10-CM

## 2020-08-10 DIAGNOSIS — D22 MELANOCYTIC NEVI: ICD-10-CM

## 2020-08-10 DIAGNOSIS — Z71.89 OTHER SPECIFIED COUNSELING: ICD-10-CM

## 2020-08-10 PROBLEM — D18.01 HEMANGIOMA OF SKIN AND SUBCUTANEOUS TISSUE: Status: ACTIVE | Noted: 2020-08-10

## 2020-08-10 PROBLEM — D22.9 MELANOCYTIC NEVI, UNSPECIFIED: Status: ACTIVE | Noted: 2020-08-10

## 2020-08-10 PROCEDURE — ? SUNSCREEN RECOMMENDATIONS

## 2020-08-10 PROCEDURE — ? COUNSELING

## 2020-08-10 PROCEDURE — 99213 OFFICE O/P EST LOW 20 MIN: CPT

## 2020-08-10 NOTE — PROCEDURE: MIPS QUALITY
Quality 431: Preventive Care And Screening: Unhealthy Alcohol Use - Screening: Patient screened for unhealthy alcohol use using a single question and scores less than 2 times per year
Quality 130: Documentation Of Current Medications In The Medical Record: Current Medications Documented
Quality 111:Pneumonia Vaccination Status For Older Adults: Pneumococcal Vaccination not Administered or Previously Received, Reason not Otherwise Specified
Detail Level: Detailed
Quality 226: Preventive Care And Screening: Tobacco Use: Screening And Cessation Intervention: Patient screened for tobacco use and is an ex/non-smoker

## 2020-08-17 ENCOUNTER — TELEPHONE (OUTPATIENT)
Dept: CARDIOLOGY | Facility: MEDICAL CENTER | Age: 65
End: 2020-08-17

## 2020-08-17 NOTE — TELEPHONE ENCOUNTER
YAYA pt requesting a refill for xarelto & metoprolol        Pharmacy: [id number] - [place], [place] - [address]

## 2020-08-19 NOTE — TELEPHONE ENCOUNTER
Patient calling regarding her medication needing a refill. She will be completely out as of today and her pharmacy has not received anything. She will like a call back at [phone].      Thank you!

## 2020-08-26 ENCOUNTER — HOSPITAL ENCOUNTER (OUTPATIENT)
Facility: MEDICAL CENTER | Age: 65
End: 2020-08-26
Payer: MEDICARE

## 2020-08-26 ENCOUNTER — HOSPITAL ENCOUNTER (OUTPATIENT)
Dept: CARDIOLOGY | Facility: MEDICAL CENTER | Age: 65
End: 2020-08-26
Attending: INTERNAL MEDICINE
Payer: MEDICARE

## 2020-08-26 DIAGNOSIS — I48.3 TYPICAL ATRIAL FLUTTER (HCC): ICD-10-CM

## 2020-08-26 LAB
LV EJECT FRACT  99904: 60
LV EJECT FRACT MOD 2C 99903: 70.74
LV EJECT FRACT MOD 4C 99902: 68.26
LV EJECT FRACT MOD BP 99901: 70.37

## 2020-08-26 PROCEDURE — 93306 TTE W/DOPPLER COMPLETE: CPT

## 2020-08-26 PROCEDURE — 93306 TTE W/DOPPLER COMPLETE: CPT | Mod: 26 | Performed by: INTERNAL MEDICINE

## 2020-09-14 ENCOUNTER — TELEPHONE (OUTPATIENT)
Dept: CARDIOLOGY | Facility: MEDICAL CENTER | Age: 65
End: 2020-09-14

## 2020-09-14 NOTE — TELEPHONE ENCOUNTER
----- Message from Yaima Fall sent at 9/14/2020  8:46 AM PDT -----  Regarding: FW: PT would like to cancel ablation procedure  Please see below  ----- Message -----  From: Alyssa Mansfield, Med Ass't  Sent: 9/14/2020   8:36 AM PDT  To: Yaima Fall  Subject: PT would like to cancel ablation procedure

## 2020-09-14 NOTE — TELEPHONE ENCOUNTER
Spoke with patient - she would like to cancel this ablation. She would like to see if she has another episode before rescheduling. She will call me back to reschedule if that happens.

## 2020-10-29 ENCOUNTER — HOSPITAL ENCOUNTER (OUTPATIENT)
Dept: LAB | Facility: MEDICAL CENTER | Age: 65
End: 2020-10-29
Attending: FAMILY MEDICINE
Payer: MEDICARE

## 2020-10-29 LAB
ALBUMIN SERPL BCP-MCNC: 4.4 G/DL (ref 3.2–4.9)
ALBUMIN/GLOB SERPL: 1.6 G/DL
ALP SERPL-CCNC: 73 U/L (ref 30–99)
ALT SERPL-CCNC: 21 U/L (ref 2–50)
AMORPH CRY #/AREA URNS HPF: PRESENT /HPF
ANION GAP SERPL CALC-SCNC: 12 MMOL/L (ref 7–16)
APPEARANCE UR: ABNORMAL
AST SERPL-CCNC: 15 U/L (ref 12–45)
BACTERIA #/AREA URNS HPF: NEGATIVE /HPF
BASOPHILS # BLD AUTO: 1.3 % (ref 0–1.8)
BASOPHILS # BLD: 0.08 K/UL (ref 0–0.12)
BILIRUB SERPL-MCNC: 0.5 MG/DL (ref 0.1–1.5)
BILIRUB UR QL STRIP.AUTO: NEGATIVE
BUN SERPL-MCNC: 13 MG/DL (ref 8–22)
CALCIUM SERPL-MCNC: 10 MG/DL (ref 8.5–10.5)
CHLORIDE SERPL-SCNC: 102 MMOL/L (ref 96–112)
CO2 SERPL-SCNC: 25 MMOL/L (ref 20–33)
COLOR UR: ABNORMAL
CREAT SERPL-MCNC: 0.63 MG/DL (ref 0.5–1.4)
EOSINOPHIL # BLD AUTO: 0.2 K/UL (ref 0–0.51)
EOSINOPHIL NFR BLD: 3.1 % (ref 0–6.9)
EPI CELLS #/AREA URNS HPF: ABNORMAL /HPF
ERYTHROCYTE [DISTWIDTH] IN BLOOD BY AUTOMATED COUNT: 44.5 FL (ref 35.9–50)
G LAMBLIA+C PARVUM AG STL QL RAPID: NORMAL
GLOBULIN SER CALC-MCNC: 2.8 G/DL (ref 1.9–3.5)
GLUCOSE SERPL-MCNC: 112 MG/DL (ref 65–99)
GLUCOSE UR STRIP.AUTO-MCNC: NEGATIVE MG/DL
HCT VFR BLD AUTO: 43.8 % (ref 37–47)
HGB BLD-MCNC: 14.7 G/DL (ref 12–16)
HYALINE CASTS #/AREA URNS LPF: ABNORMAL /LPF
IMM GRANULOCYTES # BLD AUTO: 0.01 K/UL (ref 0–0.11)
IMM GRANULOCYTES NFR BLD AUTO: 0.2 % (ref 0–0.9)
KETONES UR STRIP.AUTO-MCNC: ABNORMAL MG/DL
LEUKOCYTE ESTERASE UR QL STRIP.AUTO: ABNORMAL
LIPASE SERPL-CCNC: 15 U/L (ref 11–82)
LYMPHOCYTES # BLD AUTO: 1.67 K/UL (ref 1–4.8)
LYMPHOCYTES NFR BLD: 26.2 % (ref 22–41)
MCH RBC QN AUTO: 31.7 PG (ref 27–33)
MCHC RBC AUTO-ENTMCNC: 33.6 G/DL (ref 33.6–35)
MCV RBC AUTO: 94.4 FL (ref 81.4–97.8)
MICRO URNS: ABNORMAL
MONOCYTES # BLD AUTO: 0.52 K/UL (ref 0–0.85)
MONOCYTES NFR BLD AUTO: 8.2 % (ref 0–13.4)
NEUTROPHILS # BLD AUTO: 3.9 K/UL (ref 2–7.15)
NEUTROPHILS NFR BLD: 61 % (ref 44–72)
NITRITE UR QL STRIP.AUTO: NEGATIVE
NRBC # BLD AUTO: 0 K/UL
NRBC BLD-RTO: 0 /100 WBC
PH UR STRIP.AUTO: 5 [PH] (ref 5–8)
PLATELET # BLD AUTO: 268 K/UL (ref 164–446)
PMV BLD AUTO: 10.7 FL (ref 9–12.9)
POTASSIUM SERPL-SCNC: 4.4 MMOL/L (ref 3.6–5.5)
PROT SERPL-MCNC: 7.2 G/DL (ref 6–8.2)
PROT UR QL STRIP: NEGATIVE MG/DL
RBC # BLD AUTO: 4.64 M/UL (ref 4.2–5.4)
RBC # URNS HPF: ABNORMAL /HPF
RBC UR QL AUTO: NEGATIVE
SARS-COV-2 AB SERPL QL IA: NORMAL
SIGNIFICANT IND 70042: NORMAL
SITE SITE: NORMAL
SODIUM SERPL-SCNC: 139 MMOL/L (ref 135–145)
SOURCE SOURCE: NORMAL
SP GR UR STRIP.AUTO: 1.03
UROBILINOGEN UR STRIP.AUTO-MCNC: 0.2 MG/DL
WBC # BLD AUTO: 6.4 K/UL (ref 4.8–10.8)
WBC #/AREA URNS HPF: ABNORMAL /HPF

## 2020-10-29 PROCEDURE — 87045 FECES CULTURE AEROBIC BACT: CPT

## 2020-10-29 PROCEDURE — 85025 COMPLETE CBC W/AUTO DIFF WBC: CPT

## 2020-10-29 PROCEDURE — 87086 URINE CULTURE/COLONY COUNT: CPT | Mod: GA

## 2020-10-29 PROCEDURE — 87328 CRYPTOSPORIDIUM AG IA: CPT

## 2020-10-29 PROCEDURE — 87329 GIARDIA AG IA: CPT

## 2020-10-29 PROCEDURE — 83690 ASSAY OF LIPASE: CPT

## 2020-10-29 PROCEDURE — 81001 URINALYSIS AUTO W/SCOPE: CPT

## 2020-10-29 PROCEDURE — 80053 COMPREHEN METABOLIC PANEL: CPT

## 2020-10-29 PROCEDURE — 87899 AGENT NOS ASSAY W/OPTIC: CPT

## 2020-10-29 PROCEDURE — 86769 SARS-COV-2 COVID-19 ANTIBODY: CPT

## 2020-10-29 PROCEDURE — 87046 STOOL CULTR AEROBIC BACT EA: CPT

## 2020-10-29 PROCEDURE — 36415 COLL VENOUS BLD VENIPUNCTURE: CPT

## 2020-10-30 LAB
E COLI SXT1+2 STL IA: NORMAL
SIGNIFICANT IND 70042: NORMAL
SITE SITE: NORMAL
SOURCE SOURCE: NORMAL

## 2020-11-01 LAB
BACTERIA UR CULT: NORMAL
SIGNIFICANT IND 70042: NORMAL
SITE SITE: NORMAL
SOURCE SOURCE: NORMAL

## 2020-11-02 LAB
BACTERIA STL CULT: NORMAL
E COLI SXT1+2 STL IA: NORMAL
SIGNIFICANT IND 70042: NORMAL
SITE SITE: NORMAL
SOURCE SOURCE: NORMAL

## 2020-11-06 ENCOUNTER — OFFICE VISIT (OUTPATIENT)
Dept: CARDIOLOGY | Facility: MEDICAL CENTER | Age: 65
End: 2020-11-06
Payer: MEDICARE

## 2020-11-06 VITALS
WEIGHT: 160 LBS | SYSTOLIC BLOOD PRESSURE: 116 MMHG | OXYGEN SATURATION: 98 % | HEART RATE: 65 BPM | HEIGHT: 65 IN | DIASTOLIC BLOOD PRESSURE: 72 MMHG | BODY MASS INDEX: 26.66 KG/M2

## 2020-11-06 DIAGNOSIS — I48.4 ATYPICAL ATRIAL FLUTTER (HCC): ICD-10-CM

## 2020-11-06 LAB — EKG IMPRESSION: NORMAL

## 2020-11-06 PROCEDURE — 93000 ELECTROCARDIOGRAM COMPLETE: CPT | Performed by: INTERNAL MEDICINE

## 2020-11-06 PROCEDURE — 99214 OFFICE O/P EST MOD 30 MIN: CPT | Performed by: INTERNAL MEDICINE

## 2020-11-06 RX ORDER — CIPROFLOXACIN 500 MG/1
TABLET, FILM COATED ORAL
COMMUNITY
Start: 2020-11-02 | End: 2023-01-18

## 2020-11-06 RX ORDER — METRONIDAZOLE 500 MG/1
TABLET ORAL
COMMUNITY
Start: 2020-11-02 | End: 2023-01-18

## 2020-11-06 ASSESSMENT — FIBROSIS 4 INDEX: FIB4 SCORE: 0.82

## 2020-11-06 NOTE — PROGRESS NOTES
Arrhythmia Clinic Note (Established patient)    DOS: 11/6/2020    Chief complaint/Reason for consult: atrial flutter    Interval History: 65-year-old female with history of typical flutter.  She had an episode of typical flutter with RVR and went to the emergency room.  When we first saw each other in clinic over the summer, she was back in sinus rhythm, she agreed that she wanted to proceed with ablation so that she could come off of Xarelto.  We did schedule ablation for October, however she canceled this ablation because she did not have recurrence of her atrial flutter.  She kept this follow-up appointment to discuss discontinuing her medications.  She is overall felt well.  She continues to have GI issues and plantar fasciitis but no palpitations.    ROS (+ highlighted in bold):  Constitutional: Fevers/chills/fatigue/weightloss  HEENT: Blurry vision/eye pain/sore throat/hearing loss  Respiratory: Shortness of breath/cough  Cardiovascular: Chest pain/palpitations/edema/orthopnea/syncope  GI: Nausea/vomitting/diarrhea  MSK: Arthralgias/myagias/muscle weakness  Skin: Rash/sores  Neurological: Numbness/tremors/vertigo  Endocrine: Excessive thirst/polyuria/cold intolerance/heat intolerance  Psych: Depression/anxiety    No past medical history on file.    Past Surgical History:   Procedure Laterality Date   • BOWEL RESECTION LAPAROSCOPIC  4/10/2009    Performed by DAVIDE HAYES at [facility]   • LYSIS ADHESIONS GENERAL  4/10/2009    Performed by DAVIDE HAYES at [facility]   • LAPAROSCOPY  4/10/2009    Performed by DAVIDE HAYES at [facility]       Social History     Socioeconomic History   • Marital status:      Spouse name: Not on file   • Number of children: Not on file   • Years of education: Not on file   • Highest education level: Not on file   Occupational History   • Not on file   Social Needs   • Financial resource strain: Not on file   • Food insecurity     " Worry: Not on file     Inability: Not on file   • Transportation needs     Medical: Not on file     Non-medical: Not on file   Tobacco Use   • Smoking status: Never Smoker   • Smokeless tobacco: Never Used   Substance and Sexual Activity   • Alcohol use: Yes     Frequency: 2-3 times a week     Drinks per session: 1 or 2     Binge frequency: Never     Comment: glass of wine at night   • Drug use: No   • Sexual activity: Not on file   Lifestyle   • Physical activity     Days per week: Not on file     Minutes per session: Not on file   • Stress: Not on file   Relationships   • Social connections     Talks on phone: Not on file     Gets together: Not on file     Attends Yazdanism service: Not on file     Active member of club or organization: Not on file     Attends meetings of clubs or organizations: Not on file     Relationship status: Not on file   • Intimate partner violence     Fear of current or ex partner: Not on file     Emotionally abused: Not on file     Physically abused: Not on file     Forced sexual activity: Not on file   Other Topics Concern   • Not on file   Social History Narrative   • Not on file       No family history on file.  No family history of premature coronary disease  Allergies   Allergen Reactions   • No Known Drug Allergy        Current Outpatient Medications   Medication Sig Dispense Refill   • metroNIDAZOLE (FLAGYL) 500 MG Tab      • ciprofloxacin (CIPRO) 500 MG Tab      • rivaroxaban (XARELTO) 20 MG Tab tablet Take 1 Tab by mouth with dinner. 90 Tab 3   • metoprolol (LOPRESSOR) 25 MG Tab Take 0.5 Tabs by mouth 2 times a day. 45 Tab 3   • cefdinir (OMNICEF) 300 MG Cap Take 300 mg by mouth.       No current facility-administered medications for this visit.        Physical Exam:  Vitals:    11/06/20 0740   BP: 116/72   BP Location: Left arm   Patient Position: Sitting   BP Cuff Size: Adult   Pulse: 65   SpO2: 98%   Weight: 72.6 kg (160 lb)   Height: 1.651 m (5' 5\")     General appearance: " NAD, conversant   Eyes: anicteric sclerae, moist conjunctivae; no lid-lag; PERRLA  HENT: Atraumatic; oropharynx clear with moist mucous membranes and no mucosal ulcerations; normal hard and soft palate  Neck: Trachea midline; FROM, supple, no thyromegaly or lymphadenopathy  Lungs: CTA, with normal respiratory effort and no intercostal retractions  CV: RRR, no MRGs, no JVD  Abdomen: Soft, non-tender; no masses or HSM  Extremities: No peripheral edema or extremity lymphadenopathy  Skin: Normal temperature, turgor and texture; no rash, ulcers or subcutaneous nodules  Psych: Appropriate affect, alert and oriented to person, place and time    Data:  Lipids:   Lab Results   Component Value Date/Time    CHOLSTRLTOT 232 (H) 05/07/2020 07:06 AM    TRIGLYCERIDE 149 05/07/2020 07:06 AM    HDL 64 05/07/2020 07:06 AM     (H) 05/07/2020 07:06 AM        BMP:  Lab Results   Component Value Date/Time    SODIUM 139 10/29/2020 1224    POTASSIUM 4.4 10/29/2020 1224    CHLORIDE 102 10/29/2020 1224    CO2 25 10/29/2020 1224    GLUCOSE 112 (H) 10/29/2020 1224    BUN 13 10/29/2020 1224    CREATININE 0.63 10/29/2020 1224    CALCIUM 10.0 10/29/2020 1224    ANION 12.0 10/29/2020 1224        TSH:   Lab Results   Component Value Date/Time    TSHULTRASEN 5.430 (H) 07/21/2020 0035        THYROXINE (T4):   No results found for: NABIL     CBC:   Lab Results   Component Value Date/Time    WBC 6.4 10/29/2020 12:24 PM    RBC 4.64 10/29/2020 12:24 PM    HEMOGLOBIN 14.7 10/29/2020 12:24 PM    HEMATOCRIT 43.8 10/29/2020 12:24 PM    MCV 94.4 10/29/2020 12:24 PM    MCH 31.7 10/29/2020 12:24 PM    MCHC 33.6 10/29/2020 12:24 PM    RDW 44.5 10/29/2020 12:24 PM    PLATELETCT 268 10/29/2020 12:24 PM    MPV 10.7 10/29/2020 12:24 PM    NEUTSPOLYS 61.00 10/29/2020 12:24 PM    LYMPHOCYTES 26.20 10/29/2020 12:24 PM    MONOCYTES 8.20 10/29/2020 12:24 PM    EOSINOPHILS 3.10 10/29/2020 12:24 PM    BASOPHILS 1.30 10/29/2020 12:24 PM    IMMGRAN 0.20 10/29/2020  12:24 PM    NRBC 0.00 10/29/2020 12:24 PM    NEUTS 3.90 10/29/2020 12:24 PM    LYMPHS 1.67 10/29/2020 12:24 PM    MONOS 0.52 10/29/2020 12:24 PM    EOS 0.20 10/29/2020 12:24 PM    BASO 0.08 10/29/2020 12:24 PM    IMMGRANAB 0.01 10/29/2020 12:24 PM    NRBCAB 0.00 10/29/2020 12:24 PM        CBC w/o DIFF  Lab Results   Component Value Date/Time    WBC 6.4 10/29/2020 12:24 PM    RBC 4.64 10/29/2020 12:24 PM    HEMOGLOBIN 14.7 10/29/2020 12:24 PM    MCV 94.4 10/29/2020 12:24 PM    MCH 31.7 10/29/2020 12:24 PM    MCHC 33.6 10/29/2020 12:24 PM    RDW 44.5 10/29/2020 12:24 PM    MPV 10.7 10/29/2020 12:24 PM       Prior echo/stress reviewed: Normal echocardiogram    EKG interpreted by me: Sinus rhythm, left anterior fascicular block    Impression/Plan:  1.  Typical atrial flutter  2.  Chronic oral anticoagulation with Xarelto    -She wishes to discontinue Xarelto and metoprolol this time  -Her stroke risk score is 1  -Because she can tell when she is in atrial flutter, and her risk is otherwise fairly low, I think it is not unreasonable to stop Xarelto and metoprolol as she wishes.  I did advise the lowest risk of stroke for her long-term would be to proceed with ablation to prevent recurrence of atrial flutter.  She understands this but does not want to do this at this time, hoping that her atrial flutter episode was a one-time thing.  -We will plan to proceed with 6-month follow-up and if she has recurrence of atrial flutter, she understands that she will resume anticoagulation and probably schedule ablation.    Benjamin Valente MD  Cardiac Electrophysiology

## 2020-11-11 ENCOUNTER — HOSPITAL ENCOUNTER (OUTPATIENT)
Dept: LAB | Facility: MEDICAL CENTER | Age: 65
End: 2020-11-11
Attending: INTERNAL MEDICINE
Payer: MEDICARE

## 2020-11-11 LAB
C DIFF DNA SPEC QL NAA+PROBE: NEGATIVE
C DIFF TOX GENS STL QL NAA+PROBE: NEGATIVE
H PYLORI AG STL QL IA: DETECTED
WBC STL QL MICRO: NORMAL

## 2020-11-11 PROCEDURE — 87338 HPYLORI STOOL AG IA: CPT

## 2020-11-11 PROCEDURE — 84443 ASSAY THYROID STIM HORMONE: CPT

## 2020-11-11 PROCEDURE — 83993 ASSAY FOR CALPROTECTIN FECAL: CPT

## 2020-11-11 PROCEDURE — 87493 C DIFF AMPLIFIED PROBE: CPT

## 2020-11-11 PROCEDURE — 89055 LEUKOCYTE ASSESSMENT FECAL: CPT

## 2020-11-11 PROCEDURE — 36415 COLL VENOUS BLD VENIPUNCTURE: CPT

## 2020-11-11 PROCEDURE — 83630 LACTOFERRIN FECAL (QUAL): CPT

## 2020-11-11 PROCEDURE — 83516 IMMUNOASSAY NONANTIBODY: CPT

## 2020-11-11 PROCEDURE — 82784 ASSAY IGA/IGD/IGG/IGM EACH: CPT

## 2020-11-13 LAB — LACTOFERRIN STL QL IA: NEGATIVE

## 2020-11-14 LAB
CALPROTECTIN STL-MCNT: <5 UG/G
IGA SERPL-MCNC: 86 MG/DL (ref 68–408)

## 2020-11-16 LAB — GLIADIN PEPTIDE+TTG IGA+IGG SER QL IA: 5 UNITS (ref 0–19)

## 2020-11-19 LAB — TEST NAME 95000: NORMAL

## 2020-12-03 ENCOUNTER — TELEPHONE (OUTPATIENT)
Dept: CARDIOLOGY | Facility: MEDICAL CENTER | Age: 65
End: 2020-12-03

## 2020-12-03 NOTE — TELEPHONE ENCOUNTER
To Dr. Valente - ok to proceed? Risk low? Ok to hold Xarelto if still taking?    Received clearance request from [facility] for Colonoscopy with Deep (Propofol) Sedation. Requesting to hold Xarelto for two days.    HX Typical AF with RVR

## 2020-12-04 NOTE — TELEPHONE ENCOUNTER
Benjamin Valente M.D.  KIMI Rae: Unspecified (Today,  2:14 PM)             Yes OK to hold, low risk      --------------------------------------    Faxed clearance to [facility] to [phone]

## 2020-12-17 NOTE — TELEPHONE ENCOUNTER
YAYA Bowman is calling from [facility] and is wondering if we cnre-fax over this clearance letter for pt colonoscopy. Gracie states she never received it.  Please fax to [phone]   Attn: Gracie     Phone: [phone]    Thaank you

## 2021-03-03 DIAGNOSIS — Z23 NEED FOR VACCINATION: ICD-10-CM

## 2021-03-11 ENCOUNTER — IMMUNIZATION (OUTPATIENT)
Dept: FAMILY PLANNING/WOMEN'S HEALTH CLINIC | Facility: IMMUNIZATION CENTER | Age: 66
End: 2021-03-11
Attending: INTERNAL MEDICINE
Payer: MEDICARE

## 2021-03-11 DIAGNOSIS — Z23 NEED FOR VACCINATION: ICD-10-CM

## 2021-03-11 DIAGNOSIS — Z23 ENCOUNTER FOR VACCINATION: Primary | ICD-10-CM

## 2021-03-11 PROCEDURE — 91300 PFIZER SARS-COV-2 VACCINE: CPT

## 2021-03-11 PROCEDURE — 0001A PFIZER SARS-COV-2 VACCINE: CPT

## 2021-04-02 ENCOUNTER — IMMUNIZATION (OUTPATIENT)
Dept: FAMILY PLANNING/WOMEN'S HEALTH CLINIC | Facility: IMMUNIZATION CENTER | Age: 66
End: 2021-04-02
Attending: INTERNAL MEDICINE
Payer: MEDICARE

## 2021-04-02 DIAGNOSIS — Z23 ENCOUNTER FOR VACCINATION: Primary | ICD-10-CM

## 2021-04-02 PROCEDURE — 0002A PFIZER SARS-COV-2 VACCINE: CPT

## 2021-04-02 PROCEDURE — 91300 PFIZER SARS-COV-2 VACCINE: CPT

## 2021-12-27 ENCOUNTER — APPOINTMENT (OUTPATIENT)
Dept: RADIOLOGY | Facility: MEDICAL CENTER | Age: 66
End: 2021-12-27
Attending: EMERGENCY MEDICINE
Payer: MEDICARE

## 2021-12-27 ENCOUNTER — HOSPITAL ENCOUNTER (EMERGENCY)
Facility: MEDICAL CENTER | Age: 66
End: 2021-12-27
Attending: EMERGENCY MEDICINE
Payer: MEDICARE

## 2021-12-27 VITALS
WEIGHT: 175.04 LBS | DIASTOLIC BLOOD PRESSURE: 87 MMHG | BODY MASS INDEX: 29.88 KG/M2 | HEART RATE: 87 BPM | TEMPERATURE: 97.8 F | SYSTOLIC BLOOD PRESSURE: 138 MMHG | HEIGHT: 64 IN | RESPIRATION RATE: 16 BRPM | OXYGEN SATURATION: 98 %

## 2021-12-27 DIAGNOSIS — S93.402A SPRAIN OF LEFT ANKLE, UNSPECIFIED LIGAMENT, INITIAL ENCOUNTER: ICD-10-CM

## 2021-12-27 PROCEDURE — 99283 EMERGENCY DEPT VISIT LOW MDM: CPT

## 2021-12-27 PROCEDURE — 73600 X-RAY EXAM OF ANKLE: CPT | Mod: LT

## 2021-12-27 PROCEDURE — 302875 HCHG BANDAGE ACE 4 OR 6""

## 2021-12-27 ASSESSMENT — FIBROSIS 4 INDEX: FIB4 SCORE: 0.83

## 2021-12-27 NOTE — ED TRIAGE NOTES
"Chief Complaint   Patient presents with   • Ankle Pain     left ankle \"feels like its broken\" no noted deformity but swelling present      /87   Pulse 87   Temp 36.6 °C (97.8 °F) (Temporal)   Resp 16   Ht 1.626 m (5' 4\")   Wt 79.4 kg (175 lb 0.7 oz)   LMP 03/12/2008   SpO2 98%   BMI 30.05 kg/m²   Has this patient been vaccinated for COVID yes  If not, would they like to be vaccinated while in the ER if eligible?  no  Would the patient like to speak with the ERP about the possibility of receiving the COVID vaccine today before making a decision? no    "

## 2021-12-28 NOTE — ED PROVIDER NOTES
"ED Provider Note    CHIEF COMPLAINT  Chief Complaint   Patient presents with   • Ankle Pain     left ankle \"feels like its broken\" no noted deformity but swelling present        HPI  Marely Coon is a 66 y.o. female who presents to the emergency department complaining of left ankle discomfort. Past medical history benign and noncontributory. Explain that she was working in the heavy snowfall on her property this afternoon when she tripped down through some snow on the some rocks twisting her left ankle. She has had persistent discomfort since then. Worse with any attempt ambulation or movement of the ankle. Denies any hip or knee pain on the same leg. No numbness or tingling. No prior history of injury to the ankle. No local orthopedic surgeon.    REVIEW OF SYSTEMS  See HPI for further details. All other systems are negative.     PAST MEDICAL HISTORY       SOCIAL HISTORY  Social History     Tobacco Use   • Smoking status: Never Smoker   • Smokeless tobacco: Never Used   Vaping Use   • Vaping Use: Never used   Substance and Sexual Activity   • Alcohol use: Yes     Comment: glass of wine at night   • Drug use: No   • Sexual activity: Not on file       SURGICAL HISTORY   has a past surgical history that includes bowel resection laparoscopic (4/10/2009); lysis adhesions general (4/10/2009); and laparoscopy (4/10/2009).    CURRENT MEDICATIONS  Home Medications     Reviewed by Cher Ruffin R.N. (Registered Nurse) on 12/27/21 at 1546  Med List Status: Not Addressed   Medication Last Dose Status   cefdinir (OMNICEF) 300 MG Cap  Active   ciprofloxacin (CIPRO) 500 MG Tab  Active   metoprolol (LOPRESSOR) 25 MG Tab  Active   metroNIDAZOLE (FLAGYL) 500 MG Tab  Active   rivaroxaban (XARELTO) 20 MG Tab tablet  Active                ALLERGIES  Allergies   Allergen Reactions   • No Known Drug Allergy        PHYSICAL EXAM  VITAL SIGNS: /87   Pulse 87   Temp 36.6 °C (97.8 °F) (Temporal)   Resp 16   Ht 1.626 m (5' " "4\")   Wt 79.4 kg (175 lb 0.7 oz)   LMP 03/12/2008   SpO2 98%   BMI 30.05 kg/m²  @ROMINA[[id number]::@  Pulse ox interpretation: I interpret this pulse ox as normal.  Constitutional: Alert in no apparent distress.  HENT: Normocephalic, Atraumatic  left lower extremity: hundred hip, thigh, knee, leg. Tender focal he to the inferior medial aspect of lateral malleolus. Remaining foot and toes are nontender. This neurovascular intact.  Skin: Warm, Dry, No erythema, No rash.   Neurologic: Alert, Grossly non-focal.   Psychiatric: Affect normal, Judgment normal, Mood normal, Appears appropriate and not intoxicated.     DX-ANKLE 2- VIEWS LEFT   Final Result      1.  There is no acute displaced fracture of the left ankle.              COURSE & MEDICAL DECISION MAKING  Pertinent Labs & Imaging studies reviewed. (See chart for details)  66-year-old female presented to the emergency department for ankle pain after fall onto rocks through snow. X-ray imaging negative. A scrap and ankle startup placed. Patient has additional mobility device at home. Will follow-up with orthopedics for outpatient serial imaging and follow-up. Most likely leg mental injury at this point.   The patient will return for worsening symptoms and is stable at the time of discharge. The patient verbalizes understanding and will comply.    FINAL IMPRESSION  1. Sprain of left ankle, unspecified ligament, initial encounter               Electronically signed by: Jose Alejo M.D., 12/27/2021 11:26 PM        "

## 2022-02-23 ENCOUNTER — APPOINTMENT (RX ONLY)
Dept: URBAN - METROPOLITAN AREA CLINIC 6 | Facility: CLINIC | Age: 67
Setting detail: DERMATOLOGY
End: 2022-02-23

## 2022-02-23 DIAGNOSIS — Z71.89 OTHER SPECIFIED COUNSELING: ICD-10-CM

## 2022-02-23 DIAGNOSIS — L57.0 ACTINIC KERATOSIS: ICD-10-CM

## 2022-02-23 DIAGNOSIS — L81.4 OTHER MELANIN HYPERPIGMENTATION: ICD-10-CM

## 2022-02-23 DIAGNOSIS — L82.1 OTHER SEBORRHEIC KERATOSIS: ICD-10-CM

## 2022-02-23 DIAGNOSIS — D22 MELANOCYTIC NEVI: ICD-10-CM

## 2022-02-23 DIAGNOSIS — D18.0 HEMANGIOMA: ICD-10-CM

## 2022-02-23 PROBLEM — D22.5 MELANOCYTIC NEVI OF TRUNK: Status: ACTIVE | Noted: 2022-02-23

## 2022-02-23 PROBLEM — D18.01 HEMANGIOMA OF SKIN AND SUBCUTANEOUS TISSUE: Status: ACTIVE | Noted: 2022-02-23

## 2022-02-23 PROCEDURE — 99213 OFFICE O/P EST LOW 20 MIN: CPT | Mod: 25

## 2022-02-23 PROCEDURE — ? SUNSCREEN TREATMENT REGIMEN

## 2022-02-23 PROCEDURE — ? PRESCRIPTION

## 2022-02-23 PROCEDURE — ? LIQUID NITROGEN

## 2022-02-23 PROCEDURE — 17003 DESTRUCT PREMALG LES 2-14: CPT

## 2022-02-23 PROCEDURE — ? PRESCRIPTION MEDICATION MANAGEMENT

## 2022-02-23 PROCEDURE — 17000 DESTRUCT PREMALG LESION: CPT

## 2022-02-23 PROCEDURE — ? COUNSELING

## 2022-02-23 RX ORDER — IMIQUIMOD 12.5 MG/.25G
1 CREAM TOPICAL QPM
Qty: 1 | Refills: 3 | Status: ERX | COMMUNITY
Start: 2022-02-23

## 2022-02-23 RX ADMIN — IMIQUIMOD 1: 12.5 CREAM TOPICAL at 00:00

## 2022-02-23 ASSESSMENT — LOCATION SIMPLE DESCRIPTION DERM
LOCATION SIMPLE: CHEST
LOCATION SIMPLE: NOSE
LOCATION SIMPLE: ABDOMEN
LOCATION SIMPLE: LEFT HAND
LOCATION SIMPLE: RIGHT HAND
LOCATION SIMPLE: LEFT TEMPLE
LOCATION SIMPLE: LEFT EAR
LOCATION SIMPLE: LEFT FOREHEAD
LOCATION SIMPLE: RIGHT FOREHEAD
LOCATION SIMPLE: UPPER BACK
LOCATION SIMPLE: LEFT CHEEK

## 2022-02-23 ASSESSMENT — LOCATION DETAILED DESCRIPTION DERM
LOCATION DETAILED: LEFT LATERAL MALAR CHEEK
LOCATION DETAILED: RIGHT NASAL DORSUM
LOCATION DETAILED: SUPERIOR THORACIC SPINE
LOCATION DETAILED: LEFT CENTRAL TEMPLE
LOCATION DETAILED: LEFT SUPERIOR CENTRAL MALAR CHEEK
LOCATION DETAILED: NASAL DORSUM
LOCATION DETAILED: LEFT INFERIOR FOREHEAD
LOCATION DETAILED: RIGHT INFERIOR FOREHEAD
LOCATION DETAILED: LEFT NASAL DORSUM
LOCATION DETAILED: LEFT SUPERIOR MEDIAL MALAR CHEEK
LOCATION DETAILED: LEFT ANTITRAGUS
LOCATION DETAILED: LEFT RADIAL DORSAL HAND
LOCATION DETAILED: EPIGASTRIC SKIN
LOCATION DETAILED: RIGHT RADIAL DORSAL HAND
LOCATION DETAILED: LEFT LATERAL TEMPLE
LOCATION DETAILED: MIDDLE STERNUM
LOCATION DETAILED: UPPER STERNUM
LOCATION DETAILED: LEFT ULNAR DORSAL HAND

## 2022-02-23 ASSESSMENT — LOCATION ZONE DERM
LOCATION ZONE: TRUNK
LOCATION ZONE: EAR
LOCATION ZONE: HAND
LOCATION ZONE: NOSE
LOCATION ZONE: FACE

## 2022-02-23 NOTE — PROCEDURE: LIQUID NITROGEN
Render Post-Care Instructions In Note?: no
Number Of Freeze-Thaw Cycles: 2 freeze-thaw cycles
Detail Level: Detailed
Post-Care Instructions: I reviewed with the patient in detail post-care instructions. Patient is to wear sunprotection, and avoid picking at any of the treated lesions. Pt may apply Vaseline to crusted or scabbing areas.
Consent: The patient's consent was obtained including but not limited to risks of crusting, scabbing, blistering, scarring, darker or lighter pigmentary change, recurrence, incomplete removal and infection.
Duration Of Freeze Thaw-Cycle (Seconds): 10
Show Applicator Variable?: Yes

## 2022-06-18 ENCOUNTER — OFFICE VISIT (OUTPATIENT)
Dept: URGENT CARE | Facility: CLINIC | Age: 67
End: 2022-06-18
Payer: MEDICARE

## 2022-06-18 ENCOUNTER — APPOINTMENT (OUTPATIENT)
Dept: RADIOLOGY | Facility: IMAGING CENTER | Age: 67
End: 2022-06-18
Attending: NURSE PRACTITIONER
Payer: MEDICARE

## 2022-06-18 VITALS
TEMPERATURE: 97.4 F | HEART RATE: 60 BPM | RESPIRATION RATE: 16 BRPM | SYSTOLIC BLOOD PRESSURE: 102 MMHG | BODY MASS INDEX: 26.66 KG/M2 | WEIGHT: 160 LBS | HEIGHT: 65 IN | OXYGEN SATURATION: 98 % | DIASTOLIC BLOOD PRESSURE: 62 MMHG

## 2022-06-18 DIAGNOSIS — R07.81 RIB PAIN ON LEFT SIDE: ICD-10-CM

## 2022-06-18 DIAGNOSIS — R07.81 RIB PAIN ON RIGHT SIDE: ICD-10-CM

## 2022-06-18 DIAGNOSIS — M62.838 MUSCLE SPASM: ICD-10-CM

## 2022-06-18 PROCEDURE — 99214 OFFICE O/P EST MOD 30 MIN: CPT | Performed by: NURSE PRACTITIONER

## 2022-06-18 PROCEDURE — 71111 X-RAY EXAM RIBS/CHEST4/> VWS: CPT | Mod: TC,FY | Performed by: NURSE PRACTITIONER

## 2022-06-18 RX ORDER — TIZANIDINE 4 MG/1
4 TABLET ORAL EVERY 6 HOURS PRN
Qty: 30 TABLET | Refills: 0 | Status: SHIPPED | OUTPATIENT
Start: 2022-06-18 | End: 2023-01-18

## 2022-06-18 RX ORDER — IMIQUIMOD 12.5 MG/.25G
CREAM TOPICAL
COMMUNITY
Start: 2022-04-07 | End: 2023-01-18

## 2022-06-18 RX ORDER — KETOROLAC TROMETHAMINE 30 MG/ML
30 INJECTION, SOLUTION INTRAMUSCULAR; INTRAVENOUS ONCE
Status: COMPLETED | OUTPATIENT
Start: 2022-06-18 | End: 2022-06-18

## 2022-06-18 RX ADMIN — KETOROLAC TROMETHAMINE 30 MG: 30 INJECTION, SOLUTION INTRAMUSCULAR; INTRAVENOUS at 11:03

## 2022-06-18 ASSESSMENT — FIBROSIS 4 INDEX: FIB4 SCORE: 0.84

## 2022-06-18 NOTE — PROGRESS NOTES
"Subjective:   Marely Coon is a 67 y.o. female who presents for Back Pain (X 4 days, RT side pain, after Chiropractor visit, hurts to breath.)       [facility]  Patient presents for evaluation of 4-day history of right-sided back/rib pain.  Patient states this began after leaning her head backwards while at her optometrist.  Patient recommended to see chiropractor, which she did \"with several episodes of cracking and popping\".  Patient states she has had significant left-sided rib pain since that visit with a chiropractor.  Has taken 1000 mg of naproxen with mild relief of her symptoms.  Denies cough, wheeze, shortness of breath, but does have pain with deep inspiration as well as while moving in certain positions.    ROS  All other systems are negative except as documented above within [facility].    MEDS:   Current Outpatient Medications:   •  metroNIDAZOLE (FLAGYL) 500 MG Tab, , Disp: , Rfl:   •  ciprofloxacin (CIPRO) 500 MG Tab, , Disp: , Rfl:   •  rivaroxaban (XARELTO) 20 MG Tab tablet, Take 1 Tab by mouth with dinner. (Patient not taking: Reported on 6/18/2022), Disp: 90 Tab, Rfl: 3  •  metoprolol (LOPRESSOR) 25 MG Tab, Take 0.5 Tabs by mouth 2 times a day. (Patient not taking: Reported on 6/18/2022), Disp: 45 Tab, Rfl: 3  •  cefdinir (OMNICEF) 300 MG Cap, Take 300 mg by mouth. (Patient not taking: Reported on 6/18/2022), Disp: , Rfl:   ALLERGIES:   Allergies   Allergen Reactions   • No Known Drug Allergy        Patient's PMH, SocHx, SurgHx, FamHx, Drug allergies and medications were reviewed.     Objective:   /62   Pulse 60   Temp 36.3 °C (97.4 °F) (Temporal)   Resp 16   Ht 1.651 m (5' 5\")   Wt 72.6 kg (160 lb)   LMP 03/12/2008   SpO2 98%   Breastfeeding No   BMI 26.63 kg/m²     Physical Exam  Vitals and nursing note reviewed.   Constitutional:       General: She is awake.      Appearance: Normal appearance. She is well-developed.   HENT:      Head: Normocephalic and atraumatic.      Right Ear: External " ear normal.      Left Ear: External ear normal.      Nose: Nose normal.      Mouth/Throat:      Mouth: Mucous membranes are moist.      Pharynx: Oropharynx is clear.   Eyes:      Extraocular Movements: Extraocular movements intact.      Conjunctiva/sclera: Conjunctivae normal.   Cardiovascular:      Rate and Rhythm: Normal rate and regular rhythm.   Pulmonary:      Effort: Pulmonary effort is normal.      Breath sounds: Normal breath sounds.          Comments: +TTP, appears uncomfortable and moving in certain positions  Chest:       Musculoskeletal:         General: Normal range of motion.      Cervical back: Normal range of motion and neck supple.   Skin:     General: Skin is warm and dry.   Neurological:      Mental Status: She is alert and oriented to person, place, and time.   Psychiatric:         Mood and Affect: Mood normal.         Behavior: Behavior normal.         Thought Content: Thought content normal.       Narrative & Impression     6/18/2022 10:40 AM     HISTORY/REASON FOR EXAM:  Bilateral rib pain, left greater than right fracture adjustment several days ago.        TECHNIQUE/EXAM DESCRIPTION AND NUMBER OF VIEWS:  One view of the chest and 5 images of the bilateral ribs.     COMPARISON: NONE     FINDINGS:  No displaced fractures or acute bony changes are noted.     There is no pneumothorax or hemothorax.     There is multilevel degenerative change in the thoracic spine.     IMPRESSION:     1.  No acute displaced rib fracture.             Exam Ended: 06/18/22 11:02 AM Last Resulted: 06/18/22 11:13 AM               Assessment/Plan:   Assessment    1. Rib pain on left side  - [id number] (WITH 1-VIEW CXR); Future  - ketorolac (TORADOL) injection 30 mg    2. Rib pain on right side  - [id number] (WITH 1-VIEW CXR); Future  - ketorolac (TORADOL) injection 30 mg    3. Muscle spasm  - tizanidine (ZANAFLEX) 4 MG Tab; Take 1 Tablet by mouth every 6 hours as needed (muscle spasm).  Dispense: 30  Tablet; Refill: 0      Vital signs stable at today's acute urgent care visit. Reviewed any test results that were completed in the clinic with patient.  Begin medications as listed. Discussed management options as indicated, to include over-the-counter anti-inflammatory cream.  Advised to only take Tylenol for next 24 hours due to Toradol given in office.    Advised the patient to follow-up with the primary care provider for recheck, reevaluation, and/or consideration of further management. Return to urgent care with any worsening/continued symptoms.  Red flags discussed and indications to immediately call 911 or present to the ED.  All questions were encouraged and answered to the patient's satisfaction and understanding, and they agree to the plan of care.     I personally reviewed prior external notes and test results pertinent to today and independently reviewed and interpreted all diagnostics ordered during this urgent care acute visit. Time spent evaluating this patient includes preparing for visit, counseling/education, exam, evaluation, obtaining history, and ordering lab/test/procedures.      Please note that this dictation was created using voice recognition software. I have made a reasonable attempt to correct obvious errors, but I expect that there are errors of grammar and possibly content that I did not discover before finalizing the note.

## 2022-09-29 ENCOUNTER — HOSPITAL ENCOUNTER (OUTPATIENT)
Dept: RADIOLOGY | Facility: MEDICAL CENTER | Age: 67
End: 2022-09-29
Attending: FAMILY MEDICINE
Payer: MEDICARE

## 2022-09-29 DIAGNOSIS — Z12.31 VISIT FOR SCREENING MAMMOGRAM: ICD-10-CM

## 2022-09-29 PROCEDURE — 77063 BREAST TOMOSYNTHESIS BI: CPT

## 2022-10-18 ENCOUNTER — APPOINTMENT (RX ONLY)
Dept: URBAN - METROPOLITAN AREA CLINIC 6 | Facility: CLINIC | Age: 67
Setting detail: DERMATOLOGY
End: 2022-10-18

## 2022-10-18 DIAGNOSIS — I78.8 OTHER DISEASES OF CAPILLARIES: ICD-10-CM

## 2022-10-18 DIAGNOSIS — L30.9 DERMATITIS, UNSPECIFIED: ICD-10-CM

## 2022-10-18 DIAGNOSIS — L82.0 INFLAMED SEBORRHEIC KERATOSIS: ICD-10-CM

## 2022-10-18 DIAGNOSIS — L57.0 ACTINIC KERATOSIS: ICD-10-CM

## 2022-10-18 PROCEDURE — 17003 DESTRUCT PREMALG LES 2-14: CPT | Mod: 59

## 2022-10-18 PROCEDURE — ? COUNSELING

## 2022-10-18 PROCEDURE — 99213 OFFICE O/P EST LOW 20 MIN: CPT | Mod: 25

## 2022-10-18 PROCEDURE — 17000 DESTRUCT PREMALG LESION: CPT | Mod: 59

## 2022-10-18 PROCEDURE — ? PRESCRIPTION

## 2022-10-18 PROCEDURE — ? LIQUID NITROGEN

## 2022-10-18 PROCEDURE — 17110 DESTRUCTION B9 LES UP TO 14: CPT

## 2022-10-18 RX ORDER — TRIAMCINOLONE ACETONIDE 1 MG/G
CREAM TOPICAL BID
Qty: 453.6 | Refills: 1 | Status: ERX | COMMUNITY
Start: 2022-10-18

## 2022-10-18 RX ADMIN — TRIAMCINOLONE ACETONIDE: 1 CREAM TOPICAL at 00:00

## 2022-10-18 ASSESSMENT — LOCATION DETAILED DESCRIPTION DERM
LOCATION DETAILED: NASAL SUPRATIP
LOCATION DETAILED: UPPER STERNUM
LOCATION DETAILED: RIGHT CENTRAL BUCCAL CHEEK
LOCATION DETAILED: LEFT ANTERIOR DISTAL THIGH
LOCATION DETAILED: NASAL DORSUM

## 2022-10-18 ASSESSMENT — LOCATION ZONE DERM
LOCATION ZONE: NOSE
LOCATION ZONE: TRUNK
LOCATION ZONE: LEG
LOCATION ZONE: FACE

## 2022-10-18 ASSESSMENT — LOCATION SIMPLE DESCRIPTION DERM
LOCATION SIMPLE: RIGHT CHEEK
LOCATION SIMPLE: NOSE
LOCATION SIMPLE: LEFT THIGH
LOCATION SIMPLE: CHEST

## 2022-10-18 NOTE — HPI: RASH
What Type Of Note Output Would You Prefer (Optional)?: Bullet Format
How Severe Is Your Rash?: moderate
Is This A New Presentation, Or A Follow-Up?: Rash
Additional History: Biological father had psoriasis

## 2022-10-18 NOTE — PROCEDURE: MIPS QUALITY
Quality 226: Preventive Care And Screening: Tobacco Use: Screening And Cessation Intervention: Patient screened for tobacco use and is an ex/non-smoker
Quality 111:Pneumonia Vaccination Status For Older Adults: Pneumococcal vaccine (PPSV23) administered on or after patient’s 60th birthday and before the end of the measurement period
Detail Level: Detailed
Quality 431: Preventive Care And Screening: Unhealthy Alcohol Use - Screening: Patient not identified as an unhealthy alcohol user when screened for unhealthy alcohol use using a systematic screening method
Quality 130: Documentation Of Current Medications In The Medical Record: Current Medications Documented

## 2022-10-18 NOTE — PROCEDURE: LIQUID NITROGEN
Show Aperture Variable?: Yes
Include Z78.9 (Other Specified Conditions Influencing Health Status) As An Associated Diagnosis?: No
Medical Necessity Information: It is in your best interest to select a reason for this procedure from the list below. All of these items fulfill various CMS LCD requirements except the new and changing color options.
Consent: The patient's consent was obtained including but not limited to risks of crusting, scabbing, blistering, scarring, darker or lighter pigmentary change, recurrence, incomplete removal and infection.
Number Of Freeze-Thaw Cycles: 3 freeze-thaw cycles
Medical Necessity Clause: This procedure was medically necessary because the lesions that were treated were:
Post-Care Instructions: I reviewed with the patient in detail post-care instructions. Patient is to wear sunprotection, and avoid picking at any of the treated lesions. Pt may apply Vaseline to crusted or scabbing areas.
Detail Level: Detailed
Spray Paint Text: The liquid nitrogen was applied to the skin utilizing a spray paint frosting technique.
Duration Of Freeze Thaw-Cycle (Seconds): 10
Application Tool (Optional): Liquid Nitrogen Sprayer

## 2022-11-11 ENCOUNTER — PATIENT MESSAGE (OUTPATIENT)
Dept: HEALTH INFORMATION MANAGEMENT | Facility: OTHER | Age: 67
End: 2022-11-11

## 2023-01-13 ENCOUNTER — APPOINTMENT (OUTPATIENT)
Dept: RADIOLOGY | Facility: MEDICAL CENTER | Age: 68
End: 2023-01-13
Attending: EMERGENCY MEDICINE
Payer: MEDICARE

## 2023-01-13 ENCOUNTER — HOSPITAL ENCOUNTER (EMERGENCY)
Facility: MEDICAL CENTER | Age: 68
End: 2023-01-13
Attending: EMERGENCY MEDICINE
Payer: MEDICARE

## 2023-01-13 VITALS
SYSTOLIC BLOOD PRESSURE: 158 MMHG | DIASTOLIC BLOOD PRESSURE: 77 MMHG | OXYGEN SATURATION: 96 % | HEIGHT: 64 IN | BODY MASS INDEX: 29.47 KG/M2 | HEART RATE: 68 BPM | TEMPERATURE: 97 F | WEIGHT: 172.62 LBS | RESPIRATION RATE: 20 BRPM

## 2023-01-13 DIAGNOSIS — S01.01XA SCALP LACERATION, INITIAL ENCOUNTER: ICD-10-CM

## 2023-01-13 DIAGNOSIS — S09.90XA CLOSED HEAD INJURY, INITIAL ENCOUNTER: ICD-10-CM

## 2023-01-13 DIAGNOSIS — S16.1XXA STRAIN OF NECK MUSCLE, INITIAL ENCOUNTER: ICD-10-CM
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
## 2023-01-13 DIAGNOSIS — R05.3 CHRONIC COUGH: ICD-10-CM

## 2023-01-13 PROCEDURE — 99284 EMERGENCY DEPT VISIT MOD MDM: CPT

## 2023-01-13 PROCEDURE — 70450 CT HEAD/BRAIN W/O DYE: CPT

## 2023-01-13 PROCEDURE — 72125 CT NECK SPINE W/O DYE: CPT

## 2023-01-13 RX ORDER — ALBUTEROL SULFATE 90 UG/1
2 AEROSOL, METERED RESPIRATORY (INHALATION) EVERY 6 HOURS PRN
Qty: 8.5 G | Refills: 0 | Status: SHIPPED | OUTPATIENT
Start: 2023-01-13 | End: 2023-01-18

## 2023-01-13 NOTE — ED PROVIDER NOTES
ED Provider Note    CHIEF COMPLAINT  Chief Complaint   Patient presents with    Head Injury    T-5000 GLF       EXTERNAL RECORDS REVIEWED  Inpatient Notes prior ed notes and Outpatient Notes   UC    Most recently was seen in the outpatient setting, urgent care for back pain.  She was seen in the emergency department in December 2021 for ankle pain.  Again seen in the emergency department in July 2020 for rapid heart rate.    HPI/ROS  LIMITATION TO HISTORY   Select: : None  OUTSIDE HISTORIAN(S):  None avail    Marely Coon is a 68 y.o. female who presents via private vehicle with a chief complaint of a ground-level fall and head injury.  Patient states she was helping some [employer] people cut down some large trees and branches at her house.  She has a very steep driveway.  She was walking down the driveway, showing them where the tree was when she did not see a patch of ice and slipped and fell, striking the back of her head.  She denies a loss of consciousness.  Initially, there is no swelling and she thought she was okay but she subsequently developed headache and swelling, prompting her ED visit.  She is not anticoagulated.  No numbness or tingling.  She had some nausea prior to arrival but this seems to have improved.  She has not vomited.  She denies injuries to her upper or lower extremities.  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    PAST MEDICAL HISTORY   None reported. Remote TBI, as a teenager    SURGICAL HISTORY   has a past surgical history that includes bowel resection laparoscopic (4/10/2009); lysis adhesions general (4/10/2009); and laparoscopy (4/10/2009).    FAMILY HISTORY  None reported    SOCIAL HISTORY  Social History     Tobacco Use    Smoking status: Never    Smokeless tobacco: Never   Vaping Use    Vaping Use: Never used   Substance and  "Sexual Activity    Alcohol use: Yes     Comment: glass of wine at night    Drug use: No    Sexual activity: Not on file       CURRENT MEDICATIONS  Home Medications    **Home medications have not yet been reviewed for this encounter**         ALLERGIES  Allergies   Allergen Reactions    No Known Drug Allergy        PHYSICAL EXAM  VITAL SIGNS: BP (!) 158/77   Pulse 68   Temp 36.1 °C (97 °F) (Temporal)   Resp 20   Ht 1.626 m (5' 4\")   Wt 78.3 kg (172 lb 9.9 oz)   LMP 03/12/2008   SpO2 96%   BMI 29.63 kg/m²    Vitals reviewed.  Constitutional: Patient is oriented to person, place, and time. Appears well-developed and well-nourished.  Mild distress.    Head: Normocephalic and atraumatic.  Contusion occipital, posterior scalp with an overlying small, 3 to 4 mm laceration  Ears: Normal external ears bilaterally.   Mouth/Throat: Oropharynx is clear and moist, no exudates.   Eyes: Conjunctivae are normal. Pupils are equal, round, and reactive to light.   Neck: No midline tenderness or step-offs.  No crepitus.  No tracheal deviation present.   Cardiovascular: Normal rate, regular rhythm and normal heart sounds. Normal peripheral pulses.  Pulmonary/Chest: Effort normal and breath sounds normal. No respiratory distress, no wheezes, rhonchi, or rales. No chest wall tenderness.  Abdominal: Soft. Bowel sounds are normal. There is no tenderness.   Musculoskeletal: No edema and no tenderness.   Neurological: Normal motor and sensory exam. No focal deficits.   Skin: Skin is warm and dry. No erythema. No pallor.   Psychiatric: Patient has a normal mood and affect.     DIAGNOSTIC STUDIES / PROCEDURES    RADIOLOGY  I have independently interpreted the diagnostic imaging associated with this visit and am waiting the final reading from the radiologist.       CT-CSPINE WITHOUT PLUS RECONS   Final Result      No CT evidence of acute cervical spine abnormality.      Moderate to severe spondylosis with trace degenerative C2/3/4 " anterolisthesis      CT-HEAD W/O   Final Result      No noncontrast CT evidence of acute intracranial hemorrhage.      Mild atrophy and white matter hypodensity is present.  This is a nonspecific finding which usually is found to represent chronic microvascular disease in patient's of this demographic.  Demyelination, age indeterminant ischemia and gliosis are also    common possibilities.      Sinus inflammatory disease            COURSE & MEDICAL DECISION MAKING    ED Observation Status? No; Patient does not meet criteria for ED Observation.     INITIAL ASSESSMENT AND PLAN  Care Narrative:     This is a pleasant and previously healthy 68-year-old female who slipped on ice in her own driveway.  She suffered a closed head injury.  She is having head and neck pain.  She is taken emergently for CT imaging of her head and neck.  The small wound on the back of her head will not require repair.    1220PM patient's reevaluated bedside.  We discussed CT findings which show chronic changes but nothing acute.  Her small scalp laceration will not need repair.  She is advised on expectation for increased soreness over the next few days before gradual improvement.  She is given head injury precautions.  She is also tells me now, that since she had COVID about 6 or 7 months ago, she has had a persistent dry cough and wonders if there is anything she can take for it.  She has not had a fever.  She has no increased work of breathing.  She has no lower extremity edema.  Lungs are clear on exam.  Doubt heart failure.  She has an upcoming appointment with her PCP.  I have advised, that this is likely just lingering from COVID.  She may benefit from an albuterol MDI which is provided.  This point, I feel she can safely be discharged home.  She is given strict return precautions.  She is well-appearing and nontoxic.  She is discharged in stable condition    ADDITIONAL PROBLEM LIST AND DISPOSITION      Barriers to care at this time,  including but not limited to:  None .     Decision tools and prescription drugs considered including, but not limited to: pain meds      FINAL DIAGNOSIS  1. Closed head injury, initial encounter    2. Fall, initial encounter    3. Strain of neck muscle, initial encounter    4. Scalp laceration, initial encounter    5. Chronic cough           Electronically signed by: Shireen Art D.O., 1/13/2023 10:36 AM

## 2023-01-13 NOTE — ED NOTES
"Pt chief complaint: head injury       Pt stated that she slipped on ice.     Pt also stated that no loss of consciousness prior or after fall.     Pt stated that she does feel nauseas and neck pain from the fall.     Swelling to the back of head is present.     Pt also stated that she feels \" shaky\"    Pt is not on blood thinners.   "

## 2023-01-18 ENCOUNTER — APPOINTMENT (OUTPATIENT)
Dept: RADIOLOGY | Facility: MEDICAL CENTER | Age: 68
End: 2023-01-18
Attending: EMERGENCY MEDICINE
Payer: MEDICARE

## 2023-01-18 ENCOUNTER — HOSPITAL ENCOUNTER (EMERGENCY)
Facility: MEDICAL CENTER | Age: 68
End: 2023-01-18
Attending: EMERGENCY MEDICINE
Payer: MEDICARE

## 2023-01-18 VITALS
OXYGEN SATURATION: 97 % | TEMPERATURE: 97.9 F | SYSTOLIC BLOOD PRESSURE: 136 MMHG | WEIGHT: 168.2 LBS | HEIGHT: 64 IN | HEART RATE: 63 BPM | DIASTOLIC BLOOD PRESSURE: 79 MMHG | BODY MASS INDEX: 28.71 KG/M2 | RESPIRATION RATE: 17 BRPM

## 2023-01-18 DIAGNOSIS — G44.309 POST-CONCUSSION HEADACHE: ICD-10-CM

## 2023-01-18 DIAGNOSIS — S06.0X0A CONCUSSION WITHOUT LOSS OF CONSCIOUSNESS, INITIAL ENCOUNTER: ICD-10-CM

## 2023-01-18 PROCEDURE — 99284 EMERGENCY DEPT VISIT MOD MDM: CPT | Mod: 25

## 2023-01-18 PROCEDURE — 70450 CT HEAD/BRAIN W/O DYE: CPT

## 2023-01-18 RX ORDER — ACETAMINOPHEN 500 MG
1000 TABLET ORAL EVERY 6 HOURS PRN
Status: SHIPPED | COMMUNITY
End: 2023-11-27

## 2023-01-18 ASSESSMENT — LIFESTYLE VARIABLES
DO YOU DRINK ALCOHOL: YES
HAVE YOU EVER FELT YOU SHOULD CUT DOWN ON YOUR DRINKING: NO

## 2023-01-18 NOTE — ED PROVIDER NOTES
ED Provider Note    CHIEF COMPLAINT  Chief Complaint   Patient presents with    T-5000 Head Injury       EXTERNAL RECORDS REVIEWED  Outpatient Notes emergency department visit on 113, patient presented after fall, had CT of head and neck both of which were unremarkable for any skull fracture or intracranial bleed or cervical spine fracture.    HPI/ROS  LIMITATION TO HISTORY   Select: : None  OUTSIDE HISTORIAN(S):  none    Marely Coon is a 68 y.o. female who presents with headache and some dizziness.  Patient reports that she fell, slipping back and hitting her head on 1/13, no LOC was seen here.  Initially she was having some headache, symptoms seem to be improving.  Subsequently increasing her activity level, shoveling and running errands and began having some return of headache as well as some dizziness she describes as more of an off-balance sensation.  She reports no vomiting, no focal weakness or numbness.  No neck pain.  No abnormal vision.  No memory issues.  She does not take any aspirin or blood thinners.  No chest pain, shortness of breath, palpitations or other concerns.    PAST MEDICAL HISTORY       SURGICAL HISTORY   has a past surgical history that includes bowel resection laparoscopic (4/10/2009); lysis adhesions general (4/10/2009); and laparoscopy (4/10/2009).    FAMILY HISTORY  No family history on file.    SOCIAL HISTORY  Social History     Tobacco Use    Smoking status: Never    Smokeless tobacco: Never   Vaping Use    Vaping Use: Never used   Substance and Sexual Activity    Alcohol use: Yes     Comment: glass of wine at night    Drug use: No    Sexual activity: Not on file       CURRENT MEDICATIONS  Home Medications       Reviewed by Catherine Del Rio (Pharmacy Tech) on 01/18/23 at 1147  Med List Status: Complete     Medication Last Dose Status   acetaminophen (TYLENOL) 500 MG Tab 1/17/2023 Active   Ascorbic Acid (VITAMIN C PO) 1/17/2023 Active   Cholecalciferol (D3 PO) 1/18/2023  "Active   Cyanocobalamin (B-12 PO) 1/18/2023 Active   Multiple Vitamins-Minerals (PRESERVISION AREDS 2 PO) 1/18/2023 Active   VITAMIN E PO 1/18/2023 Active                    ALLERGIES  Allergies   Allergen Reactions    No Known Drug Allergy        PHYSICAL EXAM  VITAL SIGNS: BP (!) 158/96   Pulse 72   Temp 36.8 °C (98.2 °F) (Temporal)   Resp 19   Ht 1.626 m (5' 4\")   Wt 76.3 kg (168 lb 3.2 oz)   LMP 03/12/2008   SpO2 94%   BMI 28.87 kg/m²    Constitutional: Alert in no apparent distress.  HENT: No signs of trauma, Bilateral external ears normal, Nose normal.   Eyes: Pupils are equal and reactive, Conjunctiva normal, Non-icteric.   Neck: Normal range of motion, No tenderness, Supple, No stridor.   Cardiovascular: Regular rate and rhythm, no murmurs.   Thorax & Lungs: Normal breath sounds, No respiratory distress, No wheezing, No chest tenderness.   Abdomen: Bowel sounds normal, Soft, No tenderness, No masses, No pulsatile masses. No peritoneal signs.  Skin: Warm, Dry, No erythema, No rash.   Back: No bony tenderness, No CVA tenderness.   Extremities: Intact distal pulses, No edema, No tenderness, No cyanosis, Negative Miguel's sign.  Musculoskeletal: Good range of motion in all major joints. No tenderness to palpation or major deformities noted.   Neurologic: Alert, cranial nerves intact, speech is appropriate or not slurred, upper extremities bilaterally exhibit no drift, no dysmetria, 5 out of 5 strength with bilateral bicep/tricep/, sensation intact to light touch throughout upper extremities. Lower extremities strength 5 out of 5 thigh extension/flexion/abduction/adduction, knee extension/flexion, dorsiflexion plantar flexion. No clonus.  2+ patella reflexes.  sensation intact to light touch.  No focal deficits noted. Ambulates with steady gait, Psychiatric: Affect normal, Judgment normal, Mood normal.       DIAGNOSTIC STUDIES / PROCEDURES      RADIOLOGY  I have independently interpreted the diagnostic " imaging associated with this visit and am waiting the final reading from the radiologist.   My preliminary interpretation is a follows: No bleeding    CT-HEAD W/O   Final Result      No evidence of acute intracranial process.               COURSE & MEDICAL DECISION MAKING        INITIAL ASSESSMENT AND PLAN  Care Narrative: 11:21 AM  Patient presenting after head injury.  Differential includes but is not limited to concussion with postconcussive symptoms, delayed  intracranial bleed, subarachnoid, subdural, epidural, skull fracture.  As such we will pursue CT imaging.  She has declined any pain medication    12:23 PM  discussed results and plan for discharge to which the patient is agreeable      ADDITIONAL PROBLEM LIST AND DISPOSITION        #1  Headache--at this point I think most likely related to postconcussive symptoms.  Patient did hit her head and with some increasing activity as had some worsening in symptoms consistent with concussion.  Her CT shows no evidence of intracranial bleed, subarachnoid, subdural, epidural or skull fracture although these were considered as above.  She has no other focal findings of trauma or areas of pain to suggest other injury at this time.  I have discussed home care with her including appropriate rest and avoidance of exertion and additional head injury which she understands well    #2 dizziness--again likely related to concussion as above.  She has no chest pain or symptoms to suggest cardiac cause for this arrhythmia although this is considered however given the temporal nature of her symptoms and injury these seem highly unlikely.  She is neurologically intact and overall well-appearing        Barriers to care at this time, including but not limited to:  none .     Decision tools and prescription drugs considered including, but not limited to:  Medications but patient has declined .    HTN/IDDM FOLLOW UP:  The patient is referred to a primary physician for blood pressure  management, diabetic screening, and for all other preventive health concerns        FINAL DIAGNOSIS  1. Concussion without loss of consciousness, initial encounter    2. Post-concussion headache               Electronically signed by: Ed Esteves M.D., 1/18/2023 11:02 AM

## 2023-01-18 NOTE — ED TRIAGE NOTES
Pt amb to rm#7a c/o t5000 head pain and dizziness x5d after fall on ice. Pt was initially seen in er day of onset of injury. Pt states symptoms worsening including headache and dizziness.

## 2023-01-18 NOTE — ED NOTES
Med rec updated and complete, per pt   Allergies reviewed, per pt  Pt reports that she has not taken any prescription medications in the last 30 days or longer.  Pt reports that she never used her ALBUTEROL inhaler.

## 2023-01-30 ENCOUNTER — HOSPITAL ENCOUNTER (OUTPATIENT)
Dept: LAB | Facility: MEDICAL CENTER | Age: 68
End: 2023-01-30
Attending: FAMILY MEDICINE
Payer: MEDICARE

## 2023-01-30 LAB
ALBUMIN SERPL BCP-MCNC: 4.9 G/DL (ref 3.2–4.9)
ALBUMIN/GLOB SERPL: 1.6 G/DL
ALP SERPL-CCNC: 99 U/L (ref 30–99)
ALT SERPL-CCNC: 27 U/L (ref 2–50)
ANION GAP SERPL CALC-SCNC: 13 MMOL/L (ref 7–16)
AST SERPL-CCNC: 19 U/L (ref 12–45)
BILIRUB SERPL-MCNC: 0.6 MG/DL (ref 0.1–1.5)
BUN SERPL-MCNC: 16 MG/DL (ref 8–22)
CALCIUM ALBUM COR SERPL-MCNC: 9.6 MG/DL (ref 8.5–10.5)
CALCIUM SERPL-MCNC: 10.3 MG/DL (ref 8.5–10.5)
CHLORIDE SERPL-SCNC: 101 MMOL/L (ref 96–112)
CHOLEST SERPL-MCNC: 301 MG/DL (ref 100–199)
CO2 SERPL-SCNC: 25 MMOL/L (ref 20–33)
CREAT SERPL-MCNC: 0.65 MG/DL (ref 0.5–1.4)
EST. AVERAGE GLUCOSE BLD GHB EST-MCNC: 123 MG/DL
FASTING STATUS PATIENT QL REPORTED: NORMAL
GFR SERPLBLD CREATININE-BSD FMLA CKD-EPI: 95 ML/MIN/1.73 M 2
GLOBULIN SER CALC-MCNC: 3.1 G/DL (ref 1.9–3.5)
GLUCOSE SERPL-MCNC: 89 MG/DL (ref 65–99)
HBA1C MFR BLD: 5.9 % (ref 4–5.6)
HDLC SERPL-MCNC: 70 MG/DL
LDLC SERPL CALC-MCNC: 185 MG/DL
POTASSIUM SERPL-SCNC: 4.8 MMOL/L (ref 3.6–5.5)
PROT SERPL-MCNC: 8 G/DL (ref 6–8.2)
SODIUM SERPL-SCNC: 139 MMOL/L (ref 135–145)
TRIGL SERPL-MCNC: 231 MG/DL (ref 0–149)

## 2023-01-30 PROCEDURE — 80061 LIPID PANEL: CPT

## 2023-01-30 PROCEDURE — 83036 HEMOGLOBIN GLYCOSYLATED A1C: CPT | Mod: GA

## 2023-01-30 PROCEDURE — 36415 COLL VENOUS BLD VENIPUNCTURE: CPT

## 2023-01-30 PROCEDURE — 80053 COMPREHEN METABOLIC PANEL: CPT

## 2023-02-17 ENCOUNTER — HOSPITAL ENCOUNTER (OUTPATIENT)
Dept: RADIOLOGY | Facility: MEDICAL CENTER | Age: 68
End: 2023-02-17
Attending: FAMILY MEDICINE
Payer: MEDICARE

## 2023-02-17 DIAGNOSIS — E78.5 HYPERLIPIDEMIA, UNSPECIFIED HYPERLIPIDEMIA TYPE: ICD-10-CM

## 2023-02-17 PROCEDURE — 4410556 CT-CARDIAC SCORING (SELF PAY ONLY)

## 2023-02-22 ENCOUNTER — APPOINTMENT (RX ONLY)
Dept: URBAN - METROPOLITAN AREA CLINIC 6 | Facility: CLINIC | Age: 68
Setting detail: DERMATOLOGY
End: 2023-02-22

## 2023-02-22 DIAGNOSIS — D18.0 HEMANGIOMA: ICD-10-CM

## 2023-02-22 DIAGNOSIS — D485 NEOPLASM OF UNCERTAIN BEHAVIOR OF SKIN: ICD-10-CM

## 2023-02-22 DIAGNOSIS — Z71.89 OTHER SPECIFIED COUNSELING: ICD-10-CM

## 2023-02-22 DIAGNOSIS — L82.1 OTHER SEBORRHEIC KERATOSIS: ICD-10-CM

## 2023-02-22 DIAGNOSIS — L57.0 ACTINIC KERATOSIS: ICD-10-CM

## 2023-02-22 DIAGNOSIS — D22 MELANOCYTIC NEVI: ICD-10-CM

## 2023-02-22 DIAGNOSIS — L81.4 OTHER MELANIN HYPERPIGMENTATION: ICD-10-CM

## 2023-02-22 PROBLEM — D18.01 HEMANGIOMA OF SKIN AND SUBCUTANEOUS TISSUE: Status: ACTIVE | Noted: 2023-02-22

## 2023-02-22 PROBLEM — D22.5 MELANOCYTIC NEVI OF TRUNK: Status: ACTIVE | Noted: 2023-02-22

## 2023-02-22 PROBLEM — D48.5 NEOPLASM OF UNCERTAIN BEHAVIOR OF SKIN: Status: ACTIVE | Noted: 2023-02-22

## 2023-02-22 PROCEDURE — 11102 TANGNTL BX SKIN SINGLE LES: CPT | Mod: 59

## 2023-02-22 PROCEDURE — ? BIOPSY BY SHAVE METHOD

## 2023-02-22 PROCEDURE — ? LIQUID NITROGEN

## 2023-02-22 PROCEDURE — 17004 DESTROY PREMAL LESIONS 15/>: CPT

## 2023-02-22 PROCEDURE — ? COUNSELING

## 2023-02-22 PROCEDURE — 99213 OFFICE O/P EST LOW 20 MIN: CPT | Mod: 25

## 2023-02-22 PROCEDURE — ? SUNSCREEN TREATMENT REGIMEN

## 2023-02-22 ASSESSMENT — LOCATION DETAILED DESCRIPTION DERM
LOCATION DETAILED: PERIUMBILICAL SKIN
LOCATION DETAILED: LEFT INFERIOR FOREHEAD
LOCATION DETAILED: LEFT INFERIOR MEDIAL FOREHEAD
LOCATION DETAILED: LEFT MEDIAL SUPERIOR CHEST
LOCATION DETAILED: STERNAL NOTCH
LOCATION DETAILED: LEFT NASAL DORSUM
LOCATION DETAILED: UPPER STERNUM
LOCATION DETAILED: LEFT RADIAL DORSAL HAND
LOCATION DETAILED: POSTERIOR MID-PARIETAL SCALP
LOCATION DETAILED: RIGHT SUPERIOR CENTRAL MALAR CHEEK
LOCATION DETAILED: RIGHT RADIAL DORSAL HAND
LOCATION DETAILED: RIGHT CENTRAL ZYGOMA
LOCATION DETAILED: LEFT INFERIOR LATERAL MALAR CHEEK
LOCATION DETAILED: MIDDLE STERNUM
LOCATION DETAILED: LEFT LATERAL MALAR CHEEK
LOCATION DETAILED: EPIGASTRIC SKIN
LOCATION DETAILED: LEFT SCAPHA
LOCATION DETAILED: LEFT ANTITRAGUS
LOCATION DETAILED: LEFT EYEBROW
LOCATION DETAILED: RIGHT INFERIOR FOREHEAD
LOCATION DETAILED: RIGHT SUPERIOR OCCIPITAL SCALP
LOCATION DETAILED: LEFT CENTRAL MALAR CHEEK
LOCATION DETAILED: LEFT ANTIHELIX
LOCATION DETAILED: LEFT ANTERIOR DISTAL THIGH
LOCATION DETAILED: LEFT SUPERIOR FOREHEAD
LOCATION DETAILED: SUPERIOR THORACIC SPINE
LOCATION DETAILED: RIGHT MEDIAL SUPERIOR CHEST

## 2023-02-22 ASSESSMENT — LOCATION ZONE DERM
LOCATION ZONE: TRUNK
LOCATION ZONE: FACE
LOCATION ZONE: SCALP
LOCATION ZONE: HAND
LOCATION ZONE: NOSE
LOCATION ZONE: LEG
LOCATION ZONE: EAR

## 2023-02-22 ASSESSMENT — LOCATION SIMPLE DESCRIPTION DERM
LOCATION SIMPLE: CHEST
LOCATION SIMPLE: LEFT FOREHEAD
LOCATION SIMPLE: ABDOMEN
LOCATION SIMPLE: POSTERIOR SCALP
LOCATION SIMPLE: UPPER BACK
LOCATION SIMPLE: RIGHT OCCIPITAL SCALP
LOCATION SIMPLE: RIGHT ZYGOMA
LOCATION SIMPLE: LEFT EAR
LOCATION SIMPLE: LEFT THIGH
LOCATION SIMPLE: RIGHT CHEEK
LOCATION SIMPLE: LEFT HAND
LOCATION SIMPLE: NOSE
LOCATION SIMPLE: LEFT EYEBROW
LOCATION SIMPLE: RIGHT FOREHEAD
LOCATION SIMPLE: RIGHT HAND
LOCATION SIMPLE: LEFT CHEEK

## 2023-06-22 ENCOUNTER — HOSPITAL ENCOUNTER (OUTPATIENT)
Dept: LAB | Facility: MEDICAL CENTER | Age: 68
End: 2023-06-22
Attending: FAMILY MEDICINE
Payer: MEDICARE

## 2023-06-22 LAB
CHOLEST SERPL-MCNC: 225 MG/DL (ref 100–199)
EST. AVERAGE GLUCOSE BLD GHB EST-MCNC: 120 MG/DL
FASTING STATUS PATIENT QL REPORTED: NORMAL
HBA1C MFR BLD: 5.8 % (ref 4–5.6)
HDLC SERPL-MCNC: 58 MG/DL
LDLC SERPL CALC-MCNC: 148 MG/DL
TRIGL SERPL-MCNC: 93 MG/DL (ref 0–149)

## 2023-06-22 PROCEDURE — 83036 HEMOGLOBIN GLYCOSYLATED A1C: CPT | Mod: GA

## 2023-06-22 PROCEDURE — 80061 LIPID PANEL: CPT

## 2023-06-22 PROCEDURE — 36415 COLL VENOUS BLD VENIPUNCTURE: CPT | Mod: GA

## 2023-06-23 ENCOUNTER — HOSPITAL ENCOUNTER (OUTPATIENT)
Facility: MEDICAL CENTER | Age: 68
End: 2023-06-23
Attending: FAMILY MEDICINE
Payer: MEDICARE

## 2023-06-23 PROCEDURE — 83704 LIPOPROTEIN BLD QUAN PART: CPT

## 2023-06-23 PROCEDURE — 80061 LIPID PANEL: CPT | Mod: XU

## 2023-06-26 LAB
CHOLEST SERPL-MCNC: 216 MG/DL
HDL PARTICAL NO Q4363: 37 UMOL/L
HDL SIZE Q4361: 9 NM
HDLC SERPL-MCNC: 57 MG/DL (ref 40–59)
HLD.LARGE SERPL-SCNC: 7 UMOL/L
L VLDL PART NO Q4357: <1.5 NMOL/L
LDL SERPL QN: 21.1 NM
LDL SERPL-SCNC: 1723 NMOL/L
LDL SMALL SERPL-SCNC: 597 NMOL/L
LDLC SERPL CALC-MCNC: 134 MG/DL
PATHOLOGY STUDY: ABNORMAL
TRIGL SERPL-MCNC: 123 MG/DL (ref 30–149)
VLDL SIZE Q4362: 43.7 NM

## 2023-07-05 ENCOUNTER — APPOINTMENT (RX ONLY)
Dept: URBAN - METROPOLITAN AREA CLINIC 4 | Facility: CLINIC | Age: 68
Setting detail: DERMATOLOGY
End: 2023-07-05

## 2023-07-05 DIAGNOSIS — L85.3 XEROSIS CUTIS: ICD-10-CM

## 2023-07-05 DIAGNOSIS — L57.0 ACTINIC KERATOSIS: ICD-10-CM

## 2023-07-05 PROBLEM — D23.72 OTHER BENIGN NEOPLASM OF SKIN OF LEFT LOWER LIMB, INCLUDING HIP: Status: ACTIVE | Noted: 2023-07-05

## 2023-07-05 PROCEDURE — ? ADDITIONAL NOTES

## 2023-07-05 PROCEDURE — 17000 DESTRUCT PREMALG LESION: CPT

## 2023-07-05 PROCEDURE — ? COUNSELING

## 2023-07-05 PROCEDURE — ? LIQUID NITROGEN

## 2023-07-05 PROCEDURE — 99213 OFFICE O/P EST LOW 20 MIN: CPT | Mod: 25

## 2023-07-05 ASSESSMENT — LOCATION DETAILED DESCRIPTION DERM
LOCATION DETAILED: RIGHT RADIAL DORSAL HAND
LOCATION DETAILED: LEFT VENTRAL DISTAL FOREARM
LOCATION DETAILED: LEFT RADIAL DORSAL HAND

## 2023-07-05 ASSESSMENT — LOCATION SIMPLE DESCRIPTION DERM
LOCATION SIMPLE: RIGHT HAND
LOCATION SIMPLE: LEFT HAND
LOCATION SIMPLE: LEFT FOREARM

## 2023-07-05 ASSESSMENT — LOCATION ZONE DERM
LOCATION ZONE: HAND
LOCATION ZONE: ARM

## 2023-07-05 NOTE — PROCEDURE: LIQUID NITROGEN
Render Note In Bullet Format When Appropriate: No
Post-Care Instructions: I reviewed with the patient in detail post-care instructions. Patient is to wear sunprotection, and avoid picking at any of the treated lesions. Pt may apply Vaseline to crusted or scabbing areas.
Number Of Freeze-Thaw Cycles: 2 freeze-thaw cycles
Consent: The patient's consent was obtained including but not limited to risks of crusting, scabbing, blistering, scarring, darker or lighter pigmentary change, recurrence, incomplete removal and infection.
Detail Level: Simple
Show Aperture Variable?: Yes
Duration Of Freeze Thaw-Cycle (Seconds): 2

## 2023-07-05 NOTE — PROCEDURE: ADDITIONAL NOTES
Detail Level: Simple
Additional Notes: Advised patient to try cereve dry skin moisturizer, and to use gloves as well.
Render Risk Assessment In Note?: no

## 2023-08-29 ENCOUNTER — APPOINTMENT (OUTPATIENT)
Dept: RADIOLOGY | Facility: MEDICAL CENTER | Age: 68
End: 2023-08-29
Attending: FAMILY MEDICINE
Payer: MEDICARE

## 2023-09-25 ENCOUNTER — APPOINTMENT (OUTPATIENT)
Dept: RADIOLOGY | Facility: MEDICAL CENTER | Age: 68
End: 2023-09-25
Attending: FAMILY MEDICINE
Payer: MEDICARE

## 2023-11-27 ENCOUNTER — OFFICE VISIT (OUTPATIENT)
Dept: URGENT CARE | Facility: CLINIC | Age: 68
End: 2023-11-27
Payer: MEDICARE

## 2023-11-27 VITALS
BODY MASS INDEX: 21.51 KG/M2 | WEIGHT: 126 LBS | DIASTOLIC BLOOD PRESSURE: 64 MMHG | RESPIRATION RATE: 18 BRPM | OXYGEN SATURATION: 100 % | TEMPERATURE: 97.9 F | SYSTOLIC BLOOD PRESSURE: 122 MMHG | HEART RATE: 67 BPM | HEIGHT: 64 IN

## 2023-11-27 DIAGNOSIS — R09.81 NASAL CONGESTION: ICD-10-CM

## 2023-11-27 DIAGNOSIS — J01.10 ACUTE NON-RECURRENT FRONTAL SINUSITIS: ICD-10-CM

## 2023-11-27 DIAGNOSIS — R05.1 ACUTE COUGH: ICD-10-CM

## 2023-11-27 PROCEDURE — 3078F DIAST BP <80 MM HG: CPT

## 2023-11-27 PROCEDURE — 3074F SYST BP LT 130 MM HG: CPT

## 2023-11-27 PROCEDURE — 99213 OFFICE O/P EST LOW 20 MIN: CPT

## 2023-11-27 RX ORDER — AMOXICILLIN AND CLAVULANATE POTASSIUM 875; 125 MG/1; MG/1
1 TABLET, FILM COATED ORAL 2 TIMES DAILY
Qty: 10 TABLET | Refills: 0 | Status: SHIPPED | OUTPATIENT
Start: 2023-11-27 | End: 2023-12-02

## 2023-11-27 RX ORDER — ALBUTEROL SULFATE 90 UG/1
2 AEROSOL, METERED RESPIRATORY (INHALATION) EVERY 6 HOURS PRN
Qty: 8.5 G | Refills: 0 | Status: SHIPPED | OUTPATIENT
Start: 2023-11-27

## 2023-11-27 RX ORDER — BENZONATATE 100 MG/1
100 CAPSULE ORAL 3 TIMES DAILY PRN
Qty: 30 CAPSULE | Refills: 0 | Status: SHIPPED | OUTPATIENT
Start: 2023-11-27

## 2023-11-27 RX ORDER — FLUTICASONE PROPIONATE 50 MCG
1 SPRAY, SUSPENSION (ML) NASAL DAILY
Qty: 16 G | Refills: 0 | Status: SHIPPED | OUTPATIENT
Start: 2023-11-27 | End: 2023-11-27

## 2023-11-27 RX ORDER — PREDNISONE 20 MG/1
20 TABLET ORAL 2 TIMES DAILY
Qty: 6 TABLET | Refills: 0 | Status: SHIPPED | OUTPATIENT
Start: 2023-11-27 | End: 2023-11-30

## 2023-11-27 NOTE — PROGRESS NOTES
Chief Complaint   Patient presents with    Nasal Congestion     X5days, States it feels like its getting in her chest, ears plugged, cough, sob, productive cough, states she will wake up in a sweat,        HISTORY OF PRESENT ILLNESS: Patient is a pleasant 68 y.o. female who presents to urgent care today ongoing nasal congestion with a cough, feeling short of breath and plugged ears for the last 5 days.  Patient states she is blowing out green to yellow sputum, taking OTC medication with little to no relief.      There are no problems to display for this patient.      Allergies:No known drug allergy    Current Outpatient Medications Ordered in Epic   Medication Sig Dispense Refill    albuterol 108 (90 Base) MCG/ACT Aero Soln inhalation aerosol Inhale 2 Puffs every 6 hours as needed for Shortness of Breath. 8.5 g 0    benzonatate (TESSALON) 100 MG Cap Take 1 Capsule by mouth 3 times a day as needed for Cough. 30 Capsule 0    predniSONE (DELTASONE) 20 MG Tab Take 1 Tablet by mouth 2 times a day for 3 days. 6 Tablet 0    amoxicillin-clavulanate (AUGMENTIN) 875-125 MG Tab Take 1 Tablet by mouth 2 times a day for 5 days. 10 Tablet 0    Multiple Vitamins-Minerals (PRESERVISION AREDS 2 PO) Take 1 Capsule by mouth every day.      Ascorbic Acid (VITAMIN C PO) Take 1 Tablet by mouth every day.      Cholecalciferol (D3 PO) Take 1 Capsule by mouth every day.      VITAMIN E PO Take 1 Capsule by mouth every day.       No current [facility]-ordered facility-administered medications on file.       History reviewed. No pertinent past medical history.    Social History     Tobacco Use    Smoking status: Never    Smokeless tobacco: Never   Vaping Use    Vaping Use: Never used   Substance Use Topics    Alcohol use: Yes     Comment: glass of wine at night    Drug use: No       No family status information on file.   History reviewed. No pertinent family history.    ROS:  Review of Systems   Constitutional: Negative for fever, chills, weight  "loss, malaise, and fatigue.   HENT: Negative for ear pain, nosebleeds, positive nasal congestion, negative sore throat and neck pain.    Eyes: Negative for vision changes.   Neuro: Positive for headache, negative sensory changes, weakness, seizure, LOC.   Cardiovascular: Negative for chest pain, palpitations, orthopnea and leg swelling.   Respiratory: Positive for cough, yellow sputum production, shortness of breath and negative wheezing.   Gastrointestinal: Negative for abdominal pain, nausea, vomiting or diarrhea.   Musculoskeletal: Negative for falls, neck pain, back pain, joint pain, myalgias.   Skin: Negative for rash, diaphoresis.     Exam:  /64   Pulse 67   Temp 36.6 °C (97.9 °F) (Temporal)   Resp 18   Ht 1.626 m (5' 4\")   Wt 57.2 kg (126 lb)   SpO2 100%   General: well-nourished, well-developed female in NAD  Head: normocephalic, atraumatic  Eyes: PERRLA, no conjunctival injection, acuity grossly intact, lids normal.  Ears: normal shape and symmetry, no tenderness, no discharge. External canals are without any significant edema or erythema. Tympanic membranes are without any inflammation, no effusion. Gross auditory acuity is intact.  Nose: symmetrical without tenderness, positive nasal discharge.  Reddened mucosal nasal beds, sinus pain and pressure with light palpation.  Mouth/Throat: reasonable hygiene, no erythema, exudates or tonsillar enlargement.  Neck: no masses, range of motion within normal limits, no tracheal deviation. No obvious thyroid enlargement.   Lymph: no cervical adenopathy. No supraclavicular adenopathy.   Neuro: alert and oriented.  No sensory deficit.   Cardiovascular: regular rate and rhythm. No edema.  Pulmonary: no distress. Chest is symmetrical with respiration, no wheezes, crackles, or rhonchi.  Positive congested cough  Abdomen: soft, non-tender, no guarding, no hepatosplenomegaly.  Musculoskeletal: no clubbing, appropriate muscle tone, gait is stable.  Skin: warm, " dry, intact, no clubbing, no cyanosis, no rashes.   Psych: appropriate mood, affect, judgement.         Assessment/Plan:  1. Nasal congestion  DISCONTINUED: fluticasone (FLONASE) 50 MCG/ACT nasal spray      2. Acute cough  albuterol 108 (90 Base) MCG/ACT Aero Soln inhalation aerosol    benzonatate (TESSALON) 100 MG Cap    predniSONE (DELTASONE) 20 MG Tab      3. Acute non-recurrent frontal sinusitis  amoxicillin-clavulanate (AUGMENTIN) 875-125 MG Tab        Patient comes in today with symptoms of cold-like symptoms for the last 5 days.  Symptoms include nasal congestion, cough with yellow and green sputum production.  They have taken OTC medication with little to no relief.  Patient has no major previous medical history.  On physical exam patient has noted reddened mucosal nasal beds with nasal congestion, sinus pain and pressure with light palpation, noted congested cough, lung sounds are clear to auscultation.  Patient given Tessalon, albuterol and prednisone for comfort measures.  Patient placed on Augmentin for ongoing sinusitis.,  Patient states she is unable to take Flonase as it causes her to have bloody noses encouraged the use of fluids, Motrin and Tylenol, vitamin C, D.  Encouraged to take this with food.  Patient is aware of the plan of care and agreeable at this time, advised to follow-up if they continue to get worse or do not improve.    Supportive care, differential diagnoses, and indications for immediate follow-up discussed with patient.   Pathogenesis of diagnosis discussed including typical length and natural progression.   Instructed to return to clinic or nearest emergency department for any change in condition, further concerns, or worsening of symptoms.  Patient states understanding of the plan of care and discharge instructions.  Instructed to make an appointment, for follow up, with primary care provider.      Please note that this dictation was created using voice recognition software. I  have made every reasonable attempt to correct obvious errors, but I expect that there are errors of grammar and possibly content that I did not discover before finalizing the note.      Katlyn SAUNDERS

## 2023-12-01 ENCOUNTER — HOSPITAL ENCOUNTER (OUTPATIENT)
Dept: LAB | Facility: MEDICAL CENTER | Age: 68
End: 2023-12-01
Attending: FAMILY MEDICINE
Payer: MEDICARE

## 2023-12-01 LAB
25(OH)D3 SERPL-MCNC: 65 NG/ML (ref 30–100)
ALBUMIN SERPL BCP-MCNC: 4 G/DL (ref 3.2–4.9)
ALBUMIN/GLOB SERPL: 1.4 G/DL
ALP SERPL-CCNC: 96 U/L (ref 30–99)
ALT SERPL-CCNC: 29 U/L (ref 2–50)
ANION GAP SERPL CALC-SCNC: 8 MMOL/L (ref 7–16)
APPEARANCE UR: CLEAR
AST SERPL-CCNC: 21 U/L (ref 12–45)
BACTERIA #/AREA URNS HPF: NEGATIVE /HPF
BASOPHILS # BLD AUTO: 1.6 % (ref 0–1.8)
BASOPHILS # BLD: 0.09 K/UL (ref 0–0.12)
BILIRUB SERPL-MCNC: 0.4 MG/DL (ref 0.1–1.5)
BILIRUB UR QL STRIP.AUTO: NEGATIVE
BUN SERPL-MCNC: 19 MG/DL (ref 8–22)
CALCIUM ALBUM COR SERPL-MCNC: 9.3 MG/DL (ref 8.5–10.5)
CALCIUM SERPL-MCNC: 9.3 MG/DL (ref 8.5–10.5)
CAOX CRY #/AREA URNS HPF: NORMAL /HPF
CHLORIDE SERPL-SCNC: 104 MMOL/L (ref 96–112)
CHOLEST SERPL-MCNC: 201 MG/DL (ref 100–199)
CO2 SERPL-SCNC: 29 MMOL/L (ref 20–33)
COLOR UR: YELLOW
CREAT SERPL-MCNC: 0.76 MG/DL (ref 0.5–1.4)
EOSINOPHIL # BLD AUTO: 0.46 K/UL (ref 0–0.51)
EOSINOPHIL NFR BLD: 8.1 % (ref 0–6.9)
EPI CELLS #/AREA URNS HPF: NEGATIVE /HPF
ERYTHROCYTE [DISTWIDTH] IN BLOOD BY AUTOMATED COUNT: 48.3 FL (ref 35.9–50)
EST. AVERAGE GLUCOSE BLD GHB EST-MCNC: 120 MG/DL
FERRITIN SERPL-MCNC: 47 NG/ML (ref 10–291)
GFR SERPLBLD CREATININE-BSD FMLA CKD-EPI: 84 ML/MIN/1.73 M 2
GLOBULIN SER CALC-MCNC: 2.8 G/DL (ref 1.9–3.5)
GLUCOSE SERPL-MCNC: 86 MG/DL (ref 65–99)
GLUCOSE UR STRIP.AUTO-MCNC: NEGATIVE MG/DL
HBA1C MFR BLD: 5.8 % (ref 4–5.6)
HCT VFR BLD AUTO: 44.7 % (ref 37–47)
HDLC SERPL-MCNC: 67 MG/DL
HGB BLD-MCNC: 14.6 G/DL (ref 12–16)
HYALINE CASTS #/AREA URNS LPF: NORMAL /LPF
IMM GRANULOCYTES # BLD AUTO: 0.02 K/UL (ref 0–0.11)
IMM GRANULOCYTES NFR BLD AUTO: 0.4 % (ref 0–0.9)
IRON SATN MFR SERPL: 17 % (ref 15–55)
IRON SERPL-MCNC: 57 UG/DL (ref 40–170)
KETONES UR STRIP.AUTO-MCNC: NEGATIVE MG/DL
LDLC SERPL CALC-MCNC: 109 MG/DL
LEUKOCYTE ESTERASE UR QL STRIP.AUTO: ABNORMAL
LYMPHOCYTES # BLD AUTO: 2.46 K/UL (ref 1–4.8)
LYMPHOCYTES NFR BLD: 43.3 % (ref 22–41)
MCH RBC QN AUTO: 28.9 PG (ref 27–33)
MCHC RBC AUTO-ENTMCNC: 32.7 G/DL (ref 32.2–35.5)
MCV RBC AUTO: 88.5 FL (ref 81.4–97.8)
MICRO URNS: ABNORMAL
MONOCYTES # BLD AUTO: 0.6 K/UL (ref 0–0.85)
MONOCYTES NFR BLD AUTO: 10.6 % (ref 0–13.4)
NEUTROPHILS # BLD AUTO: 2.05 K/UL (ref 1.82–7.42)
NEUTROPHILS NFR BLD: 36 % (ref 44–72)
NITRITE UR QL STRIP.AUTO: NEGATIVE
NRBC # BLD AUTO: 0 K/UL
NRBC BLD-RTO: 0 /100 WBC (ref 0–0.2)
PH UR STRIP.AUTO: 5.5 [PH] (ref 5–8)
PLATELET # BLD AUTO: 260 K/UL (ref 164–446)
PMV BLD AUTO: 10.4 FL (ref 9–12.9)
POTASSIUM SERPL-SCNC: 4.3 MMOL/L (ref 3.6–5.5)
PROT SERPL-MCNC: 6.8 G/DL (ref 6–8.2)
PROT UR QL STRIP: NEGATIVE MG/DL
RBC # BLD AUTO: 5.05 M/UL (ref 4.2–5.4)
RBC # URNS HPF: NORMAL /HPF
RBC UR QL AUTO: NEGATIVE
SODIUM SERPL-SCNC: 141 MMOL/L (ref 135–145)
SP GR UR STRIP.AUTO: 1.02
T3FREE SERPL-MCNC: 4.7 PG/ML (ref 2–4.4)
T4 FREE SERPL-MCNC: 1.69 NG/DL (ref 0.93–1.7)
TIBC SERPL-MCNC: 343 UG/DL (ref 250–450)
TRIGL SERPL-MCNC: 127 MG/DL (ref 0–149)
TSH SERPL DL<=0.005 MIU/L-ACNC: 6.79 UIU/ML (ref 0.38–5.33)
UIBC SERPL-MCNC: 286 UG/DL (ref 110–370)
UROBILINOGEN UR STRIP.AUTO-MCNC: 0.2 MG/DL
VIT B12 SERPL-MCNC: 639 PG/ML (ref 211–911)
WBC # BLD AUTO: 5.7 K/UL (ref 4.8–10.8)
WBC #/AREA URNS HPF: NORMAL /HPF

## 2023-12-01 PROCEDURE — 36415 COLL VENOUS BLD VENIPUNCTURE: CPT

## 2023-12-01 PROCEDURE — 84439 ASSAY OF FREE THYROXINE: CPT

## 2023-12-01 PROCEDURE — 81001 URINALYSIS AUTO W/SCOPE: CPT

## 2023-12-01 PROCEDURE — 80053 COMPREHEN METABOLIC PANEL: CPT

## 2023-12-01 PROCEDURE — 82306 VITAMIN D 25 HYDROXY: CPT

## 2023-12-01 PROCEDURE — 85025 COMPLETE CBC W/AUTO DIFF WBC: CPT

## 2023-12-01 PROCEDURE — 82728 ASSAY OF FERRITIN: CPT

## 2023-12-01 PROCEDURE — 83036 HEMOGLOBIN GLYCOSYLATED A1C: CPT | Mod: GA

## 2023-12-01 PROCEDURE — 80061 LIPID PANEL: CPT

## 2023-12-01 PROCEDURE — 83540 ASSAY OF IRON: CPT

## 2023-12-01 PROCEDURE — 83550 IRON BINDING TEST: CPT

## 2023-12-01 PROCEDURE — 87086 URINE CULTURE/COLONY COUNT: CPT

## 2023-12-01 PROCEDURE — 84443 ASSAY THYROID STIM HORMONE: CPT

## 2023-12-01 PROCEDURE — 84481 FREE ASSAY (FT-3): CPT

## 2023-12-01 PROCEDURE — 82607 VITAMIN B-12: CPT

## 2023-12-03 LAB
BACTERIA UR CULT: NORMAL
SIGNIFICANT IND 70042: NORMAL
SITE SITE: NORMAL
SOURCE SOURCE: NORMAL

## 2024-03-13 ENCOUNTER — APPOINTMENT (RX ONLY)
Dept: URBAN - METROPOLITAN AREA CLINIC 4 | Facility: CLINIC | Age: 69
Setting detail: DERMATOLOGY
End: 2024-03-13

## 2024-03-13 DIAGNOSIS — L81.4 OTHER MELANIN HYPERPIGMENTATION: ICD-10-CM

## 2024-03-13 DIAGNOSIS — L82.1 OTHER SEBORRHEIC KERATOSIS: ICD-10-CM

## 2024-03-13 DIAGNOSIS — D22 MELANOCYTIC NEVI: ICD-10-CM

## 2024-03-13 DIAGNOSIS — D18.0 HEMANGIOMA: ICD-10-CM

## 2024-03-13 DIAGNOSIS — L57.0 ACTINIC KERATOSIS: ICD-10-CM

## 2024-03-13 DIAGNOSIS — Z71.89 OTHER SPECIFIED COUNSELING: ICD-10-CM

## 2024-03-13 PROBLEM — D22.5 MELANOCYTIC NEVI OF TRUNK: Status: ACTIVE | Noted: 2024-03-13

## 2024-03-13 PROBLEM — D18.01 HEMANGIOMA OF SKIN AND SUBCUTANEOUS TISSUE: Status: ACTIVE | Noted: 2024-03-13

## 2024-03-13 PROCEDURE — 99213 OFFICE O/P EST LOW 20 MIN: CPT | Mod: 25

## 2024-03-13 PROCEDURE — ? ADDITIONAL NOTES

## 2024-03-13 PROCEDURE — ? SUNSCREEN RECOMMENDATIONS

## 2024-03-13 PROCEDURE — ? LIQUID NITROGEN

## 2024-03-13 PROCEDURE — ? COUNSELING

## 2024-03-13 PROCEDURE — 17003 DESTRUCT PREMALG LES 2-14: CPT

## 2024-03-13 PROCEDURE — 17000 DESTRUCT PREMALG LESION: CPT

## 2024-03-13 ASSESSMENT — LOCATION DETAILED DESCRIPTION DERM
LOCATION DETAILED: RIGHT POSTERIOR SHOULDER
LOCATION DETAILED: EPIGASTRIC SKIN
LOCATION DETAILED: LEFT POSTERIOR SHOULDER
LOCATION DETAILED: INFERIOR THORACIC SPINE
LOCATION DETAILED: LEFT INFERIOR VERMILION LIP
LOCATION DETAILED: SUPERIOR LUMBAR SPINE
LOCATION DETAILED: RIGHT RADIAL DORSAL HAND

## 2024-03-13 ASSESSMENT — LOCATION ZONE DERM
LOCATION ZONE: LIP
LOCATION ZONE: ARM
LOCATION ZONE: HAND
LOCATION ZONE: TRUNK

## 2024-03-13 ASSESSMENT — LOCATION SIMPLE DESCRIPTION DERM
LOCATION SIMPLE: UPPER BACK
LOCATION SIMPLE: LEFT LIP
LOCATION SIMPLE: LEFT SHOULDER
LOCATION SIMPLE: RIGHT HAND
LOCATION SIMPLE: ABDOMEN
LOCATION SIMPLE: LOWER BACK
LOCATION SIMPLE: RIGHT SHOULDER

## 2024-03-13 NOTE — PROCEDURE: LIQUID NITROGEN
Aperture Size (Optional): C
Show Applicator Variable?: Yes
Duration Of Freeze Thaw-Cycle (Seconds): 2
Application Tool (Optional): Liquid Nitrogen Sprayer
Render Note In Bullet Format When Appropriate: No
Number Of Freeze-Thaw Cycles: 2 freeze-thaw cycles
Post-Care Instructions: I reviewed with the patient in detail post-care instructions. Patient is to wear sunprotection, and avoid picking at any of the treated lesions. Pt may apply Vaseline to crusted or scabbing areas.
Consent: The patient's consent was obtained including but not limited to risks of crusting, scabbing, blistering, scarring, darker or lighter pigmentary change, recurrence, incomplete removal and infection.
Detail Level: Simple

## 2024-03-13 NOTE — PROCEDURE: ADDITIONAL NOTES
Detail Level: Simple
Render Risk Assessment In Note?: no
Additional Notes: Pt has Imiquimod cream at home. She denied LN2 on her lip, she will use the cream for now. If she sees no improvement she will schedule an appointment for further treatment.
Breast Ca  (L)  Colon Cancer    Coronary artery disease    Diabetes mellitus    HTN (Hypertension)    Hyperlipidemia    Renal insufficiency  per pt  Vertigo

## 2024-07-10 ENCOUNTER — APPOINTMENT (RX ONLY)
Dept: URBAN - METROPOLITAN AREA CLINIC 36 | Facility: CLINIC | Age: 69
Setting detail: DERMATOLOGY
End: 2024-07-10

## 2024-07-10 DIAGNOSIS — Z41.9 ENCOUNTER FOR PROCEDURE FOR PURPOSES OTHER THAN REMEDYING HEALTH STATE, UNSPECIFIED: ICD-10-CM

## 2024-07-10 PROCEDURE — ? COSMETIC CONSULTATION: LASER RESURFACING

## 2024-07-10 NOTE — HPI: COSMETIC CONSULTATION
When Outside In The Sun, Do You...: rarely burns, mostly tans
Additional History: Referred by Marlen Biggs

## 2024-08-08 ENCOUNTER — APPOINTMENT (RX ONLY)
Dept: URBAN - METROPOLITAN AREA CLINIC 36 | Facility: CLINIC | Age: 69
Setting detail: DERMATOLOGY
End: 2024-08-08

## 2024-08-08 DIAGNOSIS — L90.8 OTHER ATROPHIC DISORDERS OF SKIN: ICD-10-CM

## 2024-08-08 PROCEDURE — ? PIXEL8

## 2024-08-08 NOTE — PROCEDURE: PIXEL8
Passes: 0
Passes: 2
Post-Care Instructions: I reviewed with the patient in detail post-care instructions. Patient should apply moisturizer until fully healed.
Tip: 64pt
Shots: [phone]
Consent: Written consent obtained, risks reviewed including but not limited to darker or lighter pigmentary change, and/or incomplete improvement.
Intensity: 7
Detail Level: Simple
Treatment Number (Optional): 1
Render Post-Care In The Note: Yes
Additional And/Or Other Treatment Area(S): full face
Use Distraction Techniques In Note: No

## 2024-09-05 ENCOUNTER — APPOINTMENT (RX ONLY)
Dept: URBAN - METROPOLITAN AREA CLINIC 20 | Facility: CLINIC | Age: 69
Setting detail: DERMATOLOGY
End: 2024-09-05

## 2024-09-05 DIAGNOSIS — L90.8 OTHER ATROPHIC DISORDERS OF SKIN: ICD-10-CM

## 2024-09-05 PROCEDURE — ? PIXEL8

## 2024-09-05 NOTE — PROCEDURE: PIXEL8
Passes: 0
Passes: 2
Post-Care Instructions: I reviewed with the patient in detail post-care instructions. Patient should apply moisturizer until fully healed.
Tip: 49pt
Shots: [phone]
Consent: Written consent obtained, risks reviewed including but not limited to darker or lighter pigmentary change, and/or incomplete improvement.
Intensity: 7
Detail Level: Simple
Render Post-Care In The Note: Yes
Additional And/Or Other Treatment Area(S): full face
Use Distraction Techniques In Note: No

## 2024-10-10 ENCOUNTER — APPOINTMENT (RX ONLY)
Dept: URBAN - METROPOLITAN AREA CLINIC 20 | Facility: CLINIC | Age: 69
Setting detail: DERMATOLOGY
End: 2024-10-10

## 2024-10-10 DIAGNOSIS — L90.8 OTHER ATROPHIC DISORDERS OF SKIN: ICD-10-CM

## 2024-10-10 PROCEDURE — ? PIXEL8

## 2024-10-10 NOTE — PROCEDURE: PIXEL8
Passes: 0
Passes: 2
Post-Care Instructions: I reviewed with the patient in detail post-care instructions. Patient should apply moisturizer until fully healed.
Tip: 64
Shots: [phone]
Consent: Written consent obtained, risks reviewed including but not limited to darker or lighter pigmentary change, and/or incomplete improvement.
Intensity: 7
Detail Level: Simple
Treatment Number (Optional): 3
Render Post-Care In The Note: Yes
Additional And/Or Other Treatment Area(S): full face
Use Distraction Techniques In Note: No

## 2024-12-12 ENCOUNTER — APPOINTMENT (OUTPATIENT)
Dept: URBAN - METROPOLITAN AREA CLINIC 20 | Facility: CLINIC | Age: 69
Setting detail: DERMATOLOGY
End: 2024-12-12

## 2024-12-12 DIAGNOSIS — Z41.9 ENCOUNTER FOR PROCEDURE FOR PURPOSES OTHER THAN REMEDYING HEALTH STATE, UNSPECIFIED: ICD-10-CM

## 2024-12-12 PROCEDURE — ? FRAXEL

## 2024-12-12 NOTE — PROCEDURE: FRAXEL
Treatment Level: 1
Treatment Level: 7
Indication: resurfacing
Small Plastic Eye Shield Text: The ocular mucosa was anesthetized with tetracaine. Once adequate anesthesia was optained, small plastic eye shields were inserted and remained in place until the procedure was completed.
Number Of Passes: 4
Was An Eye Shield Used?: No
Location: full face except eyelids
Wavelength: 1927nm
Medium Plastic Eye Shield Text: The ocular mucosa was anesthetized with tetracaine. Once adequate anesthesia was optained, medium plastic eye shields were inserted and remained in place until the procedure was completed.
Small Metal Eye Shield Text: The ocular mucosa was anesthetized with tetracaine. Once adequate anesthesia was optained, small metal eye shields were inserted and remained in place until the procedure was completed.
Consent: Written consent obtained, risks reviewed including but not limited to pain and incomplete improvement.
Large Plastic Eye Shield Text: The ocular mucosa was anesthetized with tetracaine. Once adequate anesthesia was optained, large plastic eye shields were inserted and remained in place until the procedure was completed.
Energy(Mj/Cm2): 20
Medium Metal Eye Shield Text: The ocular mucosa was anesthetized with tetracaine. Once adequate anesthesia was optained, medium metal eye shields were inserted and remained in place until the procedure was completed.
Detail Level: Zone
Post-Care Instructions: I reviewed with the patient in detail post-care instructions. Patient should avoid sun until area fully healed.
Large Metal Eye Shield Text: The ocular mucosa was anesthetized with tetracaine. Once adequate anesthesia was optained, large metal eye shields were inserted and remained in place until the procedure was completed.
External Cooling Fan Speed: 5

## 2025-01-20 ENCOUNTER — HOSPITAL ENCOUNTER (OUTPATIENT)
Dept: LAB | Facility: MEDICAL CENTER | Age: 70
End: 2025-01-20
Attending: FAMILY MEDICINE
Payer: MEDICARE

## 2025-01-20 LAB
25(OH)D3 SERPL-MCNC: 46 NG/ML (ref 30–100)
ALBUMIN SERPL BCP-MCNC: 4 G/DL (ref 3.2–4.9)
ALBUMIN/GLOB SERPL: 1.5 G/DL
ALP SERPL-CCNC: 85 U/L (ref 30–99)
ALT SERPL-CCNC: 18 U/L (ref 2–50)
ANION GAP SERPL CALC-SCNC: 11 MMOL/L (ref 7–16)
AST SERPL-CCNC: 17 U/L (ref 12–45)
BASOPHILS # BLD AUTO: 1.8 % (ref 0–1.8)
BASOPHILS # BLD: 0.1 K/UL (ref 0–0.12)
BILIRUB SERPL-MCNC: 0.3 MG/DL (ref 0.1–1.5)
BUN SERPL-MCNC: 19 MG/DL (ref 8–22)
CALCIUM ALBUM COR SERPL-MCNC: 9.1 MG/DL (ref 8.5–10.5)
CALCIUM SERPL-MCNC: 9.1 MG/DL (ref 8.5–10.5)
CHLORIDE SERPL-SCNC: 106 MMOL/L (ref 96–112)
CHOLEST SERPL-MCNC: 223 MG/DL (ref 100–199)
CO2 SERPL-SCNC: 23 MMOL/L (ref 20–33)
CREAT SERPL-MCNC: 0.56 MG/DL (ref 0.5–1.4)
EOSINOPHIL # BLD AUTO: 0.33 K/UL (ref 0–0.51)
EOSINOPHIL NFR BLD: 5.9 % (ref 0–6.9)
ERYTHROCYTE [DISTWIDTH] IN BLOOD BY AUTOMATED COUNT: 48.1 FL (ref 35.9–50)
EST. AVERAGE GLUCOSE BLD GHB EST-MCNC: 114 MG/DL
GFR SERPLBLD CREATININE-BSD FMLA CKD-EPI: 97 ML/MIN/1.73 M 2
GLOBULIN SER CALC-MCNC: 2.6 G/DL (ref 1.9–3.5)
GLUCOSE SERPL-MCNC: 99 MG/DL (ref 65–99)
HBA1C MFR BLD: 5.6 % (ref 4–5.6)
HCT VFR BLD AUTO: 34.7 % (ref 37–47)
HDLC SERPL-MCNC: 63 MG/DL
HGB BLD-MCNC: 10.8 G/DL (ref 12–16)
IMM GRANULOCYTES # BLD AUTO: 0.01 K/UL (ref 0–0.11)
IMM GRANULOCYTES NFR BLD AUTO: 0.2 % (ref 0–0.9)
LDLC SERPL CALC-MCNC: 125 MG/DL
LYMPHOCYTES # BLD AUTO: 1.68 K/UL (ref 1–4.8)
LYMPHOCYTES NFR BLD: 30.3 % (ref 22–41)
MCH RBC QN AUTO: 25.7 PG (ref 27–33)
MCHC RBC AUTO-ENTMCNC: 31.1 G/DL (ref 32.2–35.5)
MCV RBC AUTO: 82.4 FL (ref 81.4–97.8)
MONOCYTES # BLD AUTO: 0.57 K/UL (ref 0–0.85)
MONOCYTES NFR BLD AUTO: 10.3 % (ref 0–13.4)
NEUTROPHILS # BLD AUTO: 2.86 K/UL (ref 1.82–7.42)
NEUTROPHILS NFR BLD: 51.5 % (ref 44–72)
NRBC # BLD AUTO: 0 K/UL
NRBC BLD-RTO: 0 /100 WBC (ref 0–0.2)
PLATELET # BLD AUTO: 336 K/UL (ref 164–446)
PMV BLD AUTO: 9.7 FL (ref 9–12.9)
POTASSIUM SERPL-SCNC: 4.2 MMOL/L (ref 3.6–5.5)
PROT SERPL-MCNC: 6.6 G/DL (ref 6–8.2)
RBC # BLD AUTO: 4.21 M/UL (ref 4.2–5.4)
SODIUM SERPL-SCNC: 140 MMOL/L (ref 135–145)
TRIGL SERPL-MCNC: 175 MG/DL (ref 0–149)
TSH SERPL-ACNC: 1.2 UIU/ML (ref 0.35–5.5)
WBC # BLD AUTO: 5.6 K/UL (ref 4.8–10.8)

## 2025-01-20 PROCEDURE — 36415 COLL VENOUS BLD VENIPUNCTURE: CPT

## 2025-01-20 PROCEDURE — 80053 COMPREHEN METABOLIC PANEL: CPT

## 2025-01-20 PROCEDURE — 84443 ASSAY THYROID STIM HORMONE: CPT

## 2025-01-20 PROCEDURE — 80061 LIPID PANEL: CPT

## 2025-01-20 PROCEDURE — 83036 HEMOGLOBIN GLYCOSYLATED A1C: CPT | Mod: GA

## 2025-01-20 PROCEDURE — 82306 VITAMIN D 25 HYDROXY: CPT

## 2025-01-20 PROCEDURE — 85025 COMPLETE CBC W/AUTO DIFF WBC: CPT

## 2025-02-13 ENCOUNTER — APPOINTMENT (OUTPATIENT)
Dept: RADIOLOGY | Facility: MEDICAL CENTER | Age: 70
End: 2025-02-13
Attending: FAMILY MEDICINE
Payer: MEDICARE

## 2025-02-24 ENCOUNTER — HOSPITAL ENCOUNTER (OUTPATIENT)
Dept: LAB | Facility: MEDICAL CENTER | Age: 70
End: 2025-02-24
Attending: FAMILY MEDICINE
Payer: MEDICARE

## 2025-02-24 LAB
FERRITIN SERPL-MCNC: 20.1 NG/ML (ref 10–291)
HEMOCCULT STL QL: NEGATIVE
IRON SATN MFR SERPL: 5 % (ref 15–55)
IRON SERPL-MCNC: 24 UG/DL (ref 40–170)
TIBC SERPL-MCNC: 441 UG/DL (ref 250–450)
UIBC SERPL-MCNC: 417 UG/DL (ref 110–370)
VIT B12 SERPL-MCNC: 1175 PG/ML (ref 211–911)

## 2025-02-24 PROCEDURE — 82607 VITAMIN B-12: CPT

## 2025-02-24 PROCEDURE — 83540 ASSAY OF IRON: CPT

## 2025-02-24 PROCEDURE — 83550 IRON BINDING TEST: CPT

## 2025-02-24 PROCEDURE — 36415 COLL VENOUS BLD VENIPUNCTURE: CPT

## 2025-02-24 PROCEDURE — 82728 ASSAY OF FERRITIN: CPT

## 2025-02-24 PROCEDURE — 82272 OCCULT BLD FECES 1-3 TESTS: CPT

## 2025-03-04 ENCOUNTER — HOSPITAL ENCOUNTER (OUTPATIENT)
Dept: LAB | Facility: MEDICAL CENTER | Age: 70
End: 2025-03-04
Attending: FAMILY MEDICINE
Payer: MEDICARE

## 2025-03-04 ENCOUNTER — APPOINTMENT (OUTPATIENT)
Dept: RADIOLOGY | Facility: MEDICAL CENTER | Age: 70
End: 2025-03-04
Attending: FAMILY MEDICINE
Payer: MEDICARE

## 2025-03-04 DIAGNOSIS — Z12.31 ENCOUNTER FOR MAMMOGRAM TO ESTABLISH BASELINE MAMMOGRAM: ICD-10-CM

## 2025-03-04 DIAGNOSIS — M81.0 SENILE OSTEOPOROSIS: ICD-10-CM

## 2025-03-04 LAB
ALBUMIN SERPL BCP-MCNC: 4.3 G/DL (ref 3.2–4.9)
ALBUMIN/GLOB SERPL: 1.4 G/DL
ALP SERPL-CCNC: 99 U/L (ref 30–99)
ALT SERPL-CCNC: 18 U/L (ref 2–50)
ANION GAP SERPL CALC-SCNC: 12 MMOL/L (ref 7–16)
AST SERPL-CCNC: 19 U/L (ref 12–45)
BASOPHILS # BLD AUTO: 2.2 % (ref 0–1.8)
BASOPHILS # BLD: 0.12 K/UL (ref 0–0.12)
BILIRUB SERPL-MCNC: 0.4 MG/DL (ref 0.1–1.5)
BUN SERPL-MCNC: 20 MG/DL (ref 8–22)
CALCIUM ALBUM COR SERPL-MCNC: 9.8 MG/DL (ref 8.5–10.5)
CALCIUM SERPL-MCNC: 10 MG/DL (ref 8.5–10.5)
CHLORIDE SERPL-SCNC: 104 MMOL/L (ref 96–112)
CO2 SERPL-SCNC: 24 MMOL/L (ref 20–33)
CREAT SERPL-MCNC: 0.73 MG/DL (ref 0.5–1.4)
EOSINOPHIL # BLD AUTO: 0.47 K/UL (ref 0–0.51)
EOSINOPHIL NFR BLD: 8.7 % (ref 0–6.9)
ERYTHROCYTE [DISTWIDTH] IN BLOOD BY AUTOMATED COUNT: 50.2 FL (ref 35.9–50)
EST. AVERAGE GLUCOSE BLD GHB EST-MCNC: 123 MG/DL
GFR SERPLBLD CREATININE-BSD FMLA CKD-EPI: 87 ML/MIN/1.73 M 2
GLOBULIN SER CALC-MCNC: 3 G/DL (ref 1.9–3.5)
GLUCOSE SERPL-MCNC: 95 MG/DL (ref 65–99)
HBA1C MFR BLD: 5.9 % (ref 4–5.6)
HCT VFR BLD AUTO: 39.3 % (ref 37–47)
HGB BLD-MCNC: 12 G/DL (ref 12–16)
IMM GRANULOCYTES # BLD AUTO: 0.01 K/UL (ref 0–0.11)
IMM GRANULOCYTES NFR BLD AUTO: 0.2 % (ref 0–0.9)
LYMPHOCYTES # BLD AUTO: 1.84 K/UL (ref 1–4.8)
LYMPHOCYTES NFR BLD: 34.1 % (ref 22–41)
MCH RBC QN AUTO: 25.3 PG (ref 27–33)
MCHC RBC AUTO-ENTMCNC: 30.5 G/DL (ref 32.2–35.5)
MCV RBC AUTO: 82.7 FL (ref 81.4–97.8)
MONOCYTES # BLD AUTO: 0.49 K/UL (ref 0–0.85)
MONOCYTES NFR BLD AUTO: 9.1 % (ref 0–13.4)
NEUTROPHILS # BLD AUTO: 2.46 K/UL (ref 1.82–7.42)
NEUTROPHILS NFR BLD: 45.7 % (ref 44–72)
NRBC # BLD AUTO: 0 K/UL
NRBC BLD-RTO: 0 /100 WBC (ref 0–0.2)
PLATELET # BLD AUTO: 364 K/UL (ref 164–446)
PMV BLD AUTO: 10.1 FL (ref 9–12.9)
POTASSIUM SERPL-SCNC: 4.6 MMOL/L (ref 3.6–5.5)
PROT SERPL-MCNC: 7.3 G/DL (ref 6–8.2)
RBC # BLD AUTO: 4.75 M/UL (ref 4.2–5.4)
SODIUM SERPL-SCNC: 140 MMOL/L (ref 135–145)
T3FREE SERPL-MCNC: 2.83 PG/ML (ref 2–4.4)
T4 FREE SERPL-MCNC: 1.19 NG/DL (ref 0.93–1.7)
TSH SERPL-ACNC: 1.82 UIU/ML (ref 0.35–5.5)
WBC # BLD AUTO: 5.4 K/UL (ref 4.8–10.8)

## 2025-03-04 PROCEDURE — 84681 ASSAY OF C-PEPTIDE: CPT

## 2025-03-04 PROCEDURE — 84439 ASSAY OF FREE THYROXINE: CPT

## 2025-03-04 PROCEDURE — 83036 HEMOGLOBIN GLYCOSYLATED A1C: CPT | Mod: GA

## 2025-03-04 PROCEDURE — 77080 DXA BONE DENSITY AXIAL: CPT

## 2025-03-04 PROCEDURE — 84481 FREE ASSAY (FT-3): CPT

## 2025-03-04 PROCEDURE — 36415 COLL VENOUS BLD VENIPUNCTURE: CPT

## 2025-03-04 PROCEDURE — 85025 COMPLETE CBC W/AUTO DIFF WBC: CPT

## 2025-03-04 PROCEDURE — 80053 COMPREHEN METABOLIC PANEL: CPT

## 2025-03-04 PROCEDURE — 84443 ASSAY THYROID STIM HORMONE: CPT

## 2025-03-06 LAB — C PEPTIDE SERPL-MCNC: 1.8 NG/ML (ref 0.5–3.3)

## 2025-03-11 ENCOUNTER — APPOINTMENT (OUTPATIENT)
Dept: CARDIOLOGY | Facility: MEDICAL CENTER | Age: 70
End: 2025-03-11
Attending: HOSPITALIST
Payer: MEDICARE

## 2025-03-11 ENCOUNTER — HOSPITAL ENCOUNTER (OUTPATIENT)
Facility: MEDICAL CENTER | Age: 70
End: 2025-03-12
Attending: EMERGENCY MEDICINE | Admitting: HOSPITALIST
Payer: MEDICARE

## 2025-03-11 ENCOUNTER — APPOINTMENT (OUTPATIENT)
Dept: RADIOLOGY | Facility: MEDICAL CENTER | Age: 70
End: 2025-03-11
Attending: HOSPITALIST
Payer: MEDICARE

## 2025-03-11 ENCOUNTER — APPOINTMENT (OUTPATIENT)
Dept: RADIOLOGY | Facility: MEDICAL CENTER | Age: 70
End: 2025-03-11
Attending: EMERGENCY MEDICINE
Payer: MEDICARE

## 2025-03-11 DIAGNOSIS — R55 SYNCOPE, UNSPECIFIED SYNCOPE TYPE: ICD-10-CM

## 2025-03-11 PROBLEM — R00.1 BRADYCARDIA: Status: ACTIVE | Noted: 2025-03-11

## 2025-03-11 PROBLEM — R74.8 ELEVATED ALKALINE PHOSPHATASE LEVEL: Status: ACTIVE | Noted: 2025-03-11

## 2025-03-11 PROBLEM — R73.9 HYPERGLYCEMIA: Status: ACTIVE | Noted: 2025-03-11

## 2025-03-11 LAB
ALBUMIN SERPL BCP-MCNC: 4.3 G/DL (ref 3.2–4.9)
ALBUMIN/GLOB SERPL: 1.7 G/DL
ALP SERPL-CCNC: 102 U/L (ref 30–99)
ALT SERPL-CCNC: 16 U/L (ref 2–50)
ANION GAP SERPL CALC-SCNC: 12 MMOL/L (ref 7–16)
AST SERPL-CCNC: 19 U/L (ref 12–45)
BASOPHILS # BLD AUTO: 1.8 % (ref 0–1.8)
BASOPHILS # BLD: 0.1 K/UL (ref 0–0.12)
BILIRUB SERPL-MCNC: 0.3 MG/DL (ref 0.1–1.5)
BUN SERPL-MCNC: 17 MG/DL (ref 8–22)
CALCIUM ALBUM COR SERPL-MCNC: 9.8 MG/DL (ref 8.5–10.5)
CALCIUM SERPL-MCNC: 10 MG/DL (ref 8.4–10.2)
CHLORIDE SERPL-SCNC: 103 MMOL/L (ref 96–112)
CO2 SERPL-SCNC: 23 MMOL/L (ref 20–33)
CREAT SERPL-MCNC: 0.71 MG/DL (ref 0.5–1.4)
D DIMER PPP IA.FEU-MCNC: 0.32 UG/ML (FEU) (ref 0–0.5)
EKG IMPRESSION: NORMAL
EOSINOPHIL # BLD AUTO: 0.24 K/UL (ref 0–0.51)
EOSINOPHIL NFR BLD: 4.4 % (ref 0–6.9)
ERYTHROCYTE [DISTWIDTH] IN BLOOD BY AUTOMATED COUNT: 54.8 FL (ref 35.9–50)
EST. AVERAGE GLUCOSE BLD GHB EST-MCNC: 120 MG/DL
GFR SERPLBLD CREATININE-BSD FMLA CKD-EPI: 90 ML/MIN/1.73 M 2
GGT SERPL-CCNC: 11 U/L (ref 7–34)
GLOBULIN SER CALC-MCNC: 2.6 G/DL (ref 1.9–3.5)
GLUCOSE SERPL-MCNC: 107 MG/DL (ref 65–99)
HBA1C MFR BLD: 5.8 % (ref 4–5.6)
HCT VFR BLD AUTO: 40.5 % (ref 37–47)
HGB BLD-MCNC: 12.5 G/DL (ref 12–16)
IMM GRANULOCYTES # BLD AUTO: 0.02 K/UL (ref 0–0.11)
IMM GRANULOCYTES NFR BLD AUTO: 0.4 % (ref 0–0.9)
LV EJECT FRACT  99904: 60
LV EJECT FRACT MOD 2C 99903: 74.67
LV EJECT FRACT MOD 4C 99902: 56.04
LV EJECT FRACT MOD BP 99901: 69
LYMPHOCYTES # BLD AUTO: 1.36 K/UL (ref 1–4.8)
LYMPHOCYTES NFR BLD: 24.8 % (ref 22–41)
MAGNESIUM SERPL-MCNC: 2.1 MG/DL (ref 1.5–2.5)
MCH RBC QN AUTO: 25.5 PG (ref 27–33)
MCHC RBC AUTO-ENTMCNC: 30.9 G/DL (ref 32.2–35.5)
MCV RBC AUTO: 82.7 FL (ref 81.4–97.8)
MONOCYTES # BLD AUTO: 0.36 K/UL (ref 0–0.85)
MONOCYTES NFR BLD AUTO: 6.6 % (ref 0–13.4)
NEUTROPHILS # BLD AUTO: 3.41 K/UL (ref 1.82–7.42)
NEUTROPHILS NFR BLD: 62 % (ref 44–72)
NRBC # BLD AUTO: 0 K/UL
NRBC BLD-RTO: 0 /100 WBC (ref 0–0.2)
PHOSPHATE SERPL-MCNC: 2.4 MG/DL (ref 2.5–4.5)
PLATELET # BLD AUTO: 318 K/UL (ref 164–446)
PMV BLD AUTO: 9.6 FL (ref 9–12.9)
POTASSIUM SERPL-SCNC: 3.9 MMOL/L (ref 3.6–5.5)
PROT SERPL-MCNC: 6.9 G/DL (ref 6–8.2)
RBC # BLD AUTO: 4.9 M/UL (ref 4.2–5.4)
SODIUM SERPL-SCNC: 138 MMOL/L (ref 135–145)
T3FREE SERPL-MCNC: 2.87 PG/ML (ref 2–4.4)
TROPONIN T SERPL-MCNC: 7 NG/L (ref 6–19)
TROPONIN T SERPL-MCNC: <6 NG/L (ref 6–19)
TROPONIN T SERPL-MCNC: <6 NG/L (ref 6–19)
TSH SERPL DL<=0.005 MIU/L-ACNC: 1.57 UIU/ML (ref 0.38–5.33)
TSH SERPL DL<=0.005 MIU/L-ACNC: 1.58 UIU/ML (ref 0.38–5.33)
WBC # BLD AUTO: 5.5 K/UL (ref 4.8–10.8)

## 2025-03-11 PROCEDURE — 99223 1ST HOSP IP/OBS HIGH 75: CPT | Performed by: HOSPITALIST

## 2025-03-11 PROCEDURE — A9270 NON-COVERED ITEM OR SERVICE: HCPCS | Performed by: HOSPITALIST

## 2025-03-11 PROCEDURE — G0378 HOSPITAL OBSERVATION PER HR: HCPCS

## 2025-03-11 PROCEDURE — 85025 COMPLETE CBC W/AUTO DIFF WBC: CPT

## 2025-03-11 PROCEDURE — 84443 ASSAY THYROID STIM HORMONE: CPT | Mod: 91

## 2025-03-11 PROCEDURE — 84481 FREE ASSAY (FT-3): CPT

## 2025-03-11 PROCEDURE — 93306 TTE W/DOPPLER COMPLETE: CPT

## 2025-03-11 PROCEDURE — 93005 ELECTROCARDIOGRAM TRACING: CPT | Mod: TC

## 2025-03-11 PROCEDURE — 80053 COMPREHEN METABOLIC PANEL: CPT

## 2025-03-11 PROCEDURE — 70450 CT HEAD/BRAIN W/O DYE: CPT

## 2025-03-11 PROCEDURE — 36415 COLL VENOUS BLD VENIPUNCTURE: CPT

## 2025-03-11 PROCEDURE — 93005 ELECTROCARDIOGRAM TRACING: CPT | Mod: TC | Performed by: HOSPITALIST

## 2025-03-11 PROCEDURE — 700105 HCHG RX REV CODE 258: Performed by: HOSPITALIST

## 2025-03-11 PROCEDURE — 84484 ASSAY OF TROPONIN QUANT: CPT | Mod: 91

## 2025-03-11 PROCEDURE — 93005 ELECTROCARDIOGRAM TRACING: CPT | Mod: TC | Performed by: EMERGENCY MEDICINE

## 2025-03-11 PROCEDURE — 84100 ASSAY OF PHOSPHORUS: CPT

## 2025-03-11 PROCEDURE — 700102 HCHG RX REV CODE 250 W/ 637 OVERRIDE(OP): Performed by: HOSPITALIST

## 2025-03-11 PROCEDURE — 83735 ASSAY OF MAGNESIUM: CPT

## 2025-03-11 PROCEDURE — 99285 EMERGENCY DEPT VISIT HI MDM: CPT

## 2025-03-11 PROCEDURE — 700111 HCHG RX REV CODE 636 W/ 250 OVERRIDE (IP): Performed by: HOSPITALIST

## 2025-03-11 PROCEDURE — 94760 N-INVAS EAR/PLS OXIMETRY 1: CPT

## 2025-03-11 PROCEDURE — 83036 HEMOGLOBIN GLYCOSYLATED A1C: CPT

## 2025-03-11 PROCEDURE — 96372 THER/PROPH/DIAG INJ SC/IM: CPT

## 2025-03-11 PROCEDURE — 93306 TTE W/DOPPLER COMPLETE: CPT | Mod: 26 | Performed by: INTERNAL MEDICINE

## 2025-03-11 PROCEDURE — 82977 ASSAY OF GGT: CPT

## 2025-03-11 PROCEDURE — 85379 FIBRIN DEGRADATION QUANT: CPT

## 2025-03-11 PROCEDURE — 71045 X-RAY EXAM CHEST 1 VIEW: CPT

## 2025-03-11 RX ORDER — FERROUS SULFATE 325(65) MG
325 TABLET ORAL DAILY
COMMUNITY

## 2025-03-11 RX ORDER — LABETALOL HYDROCHLORIDE 5 MG/ML
10 INJECTION, SOLUTION INTRAVENOUS EVERY 4 HOURS PRN
Status: DISCONTINUED | OUTPATIENT
Start: 2025-03-11 | End: 2025-03-12 | Stop reason: HOSPADM

## 2025-03-11 RX ORDER — REGADENOSON 0.08 MG/ML
0.4 INJECTION, SOLUTION INTRAVENOUS
Status: COMPLETED | OUTPATIENT
Start: 2025-03-11 | End: 2025-03-12

## 2025-03-11 RX ORDER — ONDANSETRON 4 MG/1
4 TABLET, ORALLY DISINTEGRATING ORAL EVERY 4 HOURS PRN
Status: DISCONTINUED | OUTPATIENT
Start: 2025-03-11 | End: 2025-03-12 | Stop reason: HOSPADM

## 2025-03-11 RX ORDER — ASPIRIN 325 MG
325 TABLET ORAL ONCE
Status: COMPLETED | OUTPATIENT
Start: 2025-03-11 | End: 2025-03-11

## 2025-03-11 RX ORDER — AMINOPHYLLINE 25 MG/ML
100 INJECTION, SOLUTION INTRAVENOUS
Status: DISCONTINUED | OUTPATIENT
Start: 2025-03-11 | End: 2025-03-12 | Stop reason: HOSPADM

## 2025-03-11 RX ORDER — MORPHINE SULFATE 4 MG/ML
2 INJECTION INTRAVENOUS
Status: DISCONTINUED | OUTPATIENT
Start: 2025-03-11 | End: 2025-03-12 | Stop reason: HOSPADM

## 2025-03-11 RX ORDER — ONDANSETRON 2 MG/ML
4 INJECTION INTRAMUSCULAR; INTRAVENOUS EVERY 4 HOURS PRN
Status: DISCONTINUED | OUTPATIENT
Start: 2025-03-11 | End: 2025-03-12 | Stop reason: HOSPADM

## 2025-03-11 RX ORDER — ASPIRIN 300 MG/1
300 SUPPOSITORY RECTAL ONCE
Status: COMPLETED | OUTPATIENT
Start: 2025-03-11 | End: 2025-03-11

## 2025-03-11 RX ORDER — ASPIRIN 81 MG/1
324 TABLET, CHEWABLE ORAL ONCE
Status: COMPLETED | OUTPATIENT
Start: 2025-03-11 | End: 2025-03-11

## 2025-03-11 RX ORDER — ACETAMINOPHEN 325 MG/1
650 TABLET ORAL EVERY 6 HOURS PRN
Status: DISCONTINUED | OUTPATIENT
Start: 2025-03-11 | End: 2025-03-12 | Stop reason: HOSPADM

## 2025-03-11 RX ORDER — SODIUM CHLORIDE 9 MG/ML
INJECTION, SOLUTION INTRAVENOUS CONTINUOUS
Status: DISCONTINUED | OUTPATIENT
Start: 2025-03-11 | End: 2025-03-12 | Stop reason: HOSPADM

## 2025-03-11 RX ORDER — OXYCODONE HYDROCHLORIDE 5 MG/1
2.5 TABLET ORAL
Refills: 0 | Status: DISCONTINUED | OUTPATIENT
Start: 2025-03-11 | End: 2025-03-12 | Stop reason: HOSPADM

## 2025-03-11 RX ORDER — ATORVASTATIN CALCIUM 40 MG/1
40 TABLET, FILM COATED ORAL EVERY EVENING
Status: DISCONTINUED | OUTPATIENT
Start: 2025-03-11 | End: 2025-03-12 | Stop reason: HOSPADM

## 2025-03-11 RX ORDER — OXYCODONE HYDROCHLORIDE 5 MG/1
5 TABLET ORAL
Refills: 0 | Status: DISCONTINUED | OUTPATIENT
Start: 2025-03-11 | End: 2025-03-12 | Stop reason: HOSPADM

## 2025-03-11 RX ORDER — HEPARIN SODIUM 5000 [USP'U]/ML
5000 INJECTION, SOLUTION INTRAVENOUS; SUBCUTANEOUS EVERY 8 HOURS
Status: DISCONTINUED | OUTPATIENT
Start: 2025-03-11 | End: 2025-03-12 | Stop reason: HOSPADM

## 2025-03-11 RX ORDER — ASPIRIN 81 MG/1
81 TABLET ORAL DAILY
Status: DISCONTINUED | OUTPATIENT
Start: 2025-03-12 | End: 2025-03-12 | Stop reason: HOSPADM

## 2025-03-11 RX ADMIN — ATORVASTATIN CALCIUM 40 MG: 40 TABLET, FILM COATED ORAL at 17:35

## 2025-03-11 RX ADMIN — SODIUM CHLORIDE: 9 INJECTION, SOLUTION INTRAVENOUS at 16:20

## 2025-03-11 RX ADMIN — HEPARIN SODIUM 5000 UNITS: 5000 INJECTION, SOLUTION INTRAVENOUS; SUBCUTANEOUS at 20:13

## 2025-03-11 RX ADMIN — ASPIRIN 325 MG: 325 TABLET ORAL at 17:35

## 2025-03-11 SDOH — ECONOMIC STABILITY: TRANSPORTATION INSECURITY
IN THE PAST 12 MONTHS, HAS THE LACK OF TRANSPORTATION KEPT YOU FROM MEDICAL APPOINTMENTS OR FROM GETTING MEDICATIONS?: NO

## 2025-03-11 SDOH — ECONOMIC STABILITY: TRANSPORTATION INSECURITY
IN THE PAST 12 MONTHS, HAS LACK OF RELIABLE TRANSPORTATION KEPT YOU FROM MEDICAL APPOINTMENTS, MEETINGS, WORK OR FROM GETTING THINGS NEEDED FOR DAILY LIVING?: NO

## 2025-03-11 ASSESSMENT — SOCIAL DETERMINANTS OF HEALTH (SDOH)

## 2025-03-11 ASSESSMENT — ENCOUNTER SYMPTOMS
DIARRHEA: 0
NAUSEA: 0
PALPITATIONS: 0
VOMITING: 0
CONSTITUTIONAL NEGATIVE: 1
MUSCULOSKELETAL NEGATIVE: 1
BRUISES/BLEEDS EASILY: 0
COUGH: 0
HEMOPTYSIS: 0
DIZZINESS: 1
WHEEZING: 0
CHILLS: 0
BLOOD IN STOOL: 0
RESPIRATORY NEGATIVE: 1
FEVER: 0
DIAPHORESIS: 0
TREMORS: 1
LOSS OF CONSCIOUSNESS: 1
HEADACHES: 0
SEIZURES: 0
DOUBLE VISION: 0
NERVOUS/ANXIOUS: 1
FOCAL WEAKNESS: 0
SORE THROAT: 0
EYES NEGATIVE: 1
CARDIOVASCULAR NEGATIVE: 1
GASTROINTESTINAL NEGATIVE: 1
CONSTIPATION: 0
HEARTBURN: 0
ABDOMINAL PAIN: 0

## 2025-03-11 ASSESSMENT — LIFESTYLE VARIABLES
TOTAL SCORE: 0
ALCOHOL_USE: NO
ON A TYPICAL DAY WHEN YOU DRINK ALCOHOL HOW MANY DRINKS DO YOU HAVE: 0
HAVE PEOPLE ANNOYED YOU BY CRITICIZING YOUR DRINKING: NO
EVER HAD A DRINK FIRST THING IN THE MORNING TO STEADY YOUR NERVES TO GET RID OF A HANGOVER: NO
HOW MANY TIMES IN THE PAST YEAR HAVE YOU HAD 5 OR MORE DRINKS IN A DAY: 0
DOES PATIENT WANT TO STOP DRINKING: NO
CONSUMPTION TOTAL: NEGATIVE
TOTAL SCORE: 0
AVERAGE NUMBER OF DAYS PER WEEK YOU HAVE A DRINK CONTAINING ALCOHOL: 0
HAVE YOU EVER FELT YOU SHOULD CUT DOWN ON YOUR DRINKING: NO
EVER FELT BAD OR GUILTY ABOUT YOUR DRINKING: NO
TOTAL SCORE: 0

## 2025-03-11 ASSESSMENT — COGNITIVE AND FUNCTIONAL STATUS - GENERAL
SUGGESTED CMS G CODE MODIFIER DAILY ACTIVITY: CH
MOBILITY SCORE: 23
DAILY ACTIVITIY SCORE: 24
CLIMB 3 TO 5 STEPS WITH RAILING: A LITTLE
SUGGESTED CMS G CODE MODIFIER MOBILITY: CI

## 2025-03-11 ASSESSMENT — PATIENT HEALTH QUESTIONNAIRE - PHQ9
SUM OF ALL RESPONSES TO PHQ9 QUESTIONS 1 AND 2: 0
2. FEELING DOWN, DEPRESSED, IRRITABLE, OR HOPELESS: NOT AT ALL
1. LITTLE INTEREST OR PLEASURE IN DOING THINGS: NOT AT ALL

## 2025-03-11 ASSESSMENT — PAIN DESCRIPTION - PAIN TYPE: TYPE: ACUTE PAIN

## 2025-03-11 ASSESSMENT — FIBROSIS 4 INDEX
FIB4 SCORE: 1.08

## 2025-03-11 ASSESSMENT — VISUAL ACUITY: OU: 1

## 2025-03-11 NOTE — ASSESSMENT & PLAN NOTE
Patient had 3 episodes of near syncope after a 2 mile walk.  She usually does a 4 mile walk.  She became so dizzy she had to sit down and she became very concerned and called the neighbors who then took her into the hospital.  She does tell me that about 4 years ago she did have an episode of what sounds like atrial fibrillation with rapid ventricular response at which point they were even thinking of doing an ablation for her through the electrophysiologist.  She never did have that procedure because everything went back to normal.  I will order a CAT scan of the head make sure that there is nothing neurological going on  Patient is complaining of just generalized weakness  Patient is bradycardic  I will admit her to telemetric and under telemetric monitoring will run serial cardiac enzymes followed by a stress test in the morning  I will obtain an echocardiogram  I will obtain a.m. cortisol level, I will obtain thyroid functions  I will check hemoglobin A1c level

## 2025-03-11 NOTE — H&P
"Hospital Medicine History & Physical Note    Date of Service  3/11/2025    Primary Care Physician  Yara Epps M.D.    Consultants  None    Specialist Names: None    Code Status  Full Code    Chief Complaint  Chief Complaint   Patient presents with    Lightheadedness     Started this am  Felt lightheaded while standing and had to quickly sit down  \"Really like I am going to pass out\"     Dizziness     \"Everything is spinning\"       History of Presenting Illness  Marely Coon is a 70 y.o. female who presented 3/11/2025 with 3 episodes of severe vertigo that started this morning after she took a walk.  The patient today only took half the walk she usually takes as she had carpet layers coming into her house.  When she got back to the house she became so dizzy she had to sit down and she could not even accommodate the carpet layers and had to call the neighbors.  The neighbor stayed at her house while the  was finished to work the other neighbor took her to the hospital.  Patient says she felt lightheaded and when she was standing she head to quickly sit down as she was about to pass out.  Patient is feeling like everything was spinning and she became dizzy and she was going to pass out the patient is found to be bradycardic in the emergency room.  The bradycardia at this point will need to be investigated patient will be placed on telemetry.  Under telemetric monitoring I will do serial cardiac enzymes followed by an echocardiogram and stress test in the morning.  Depending on results further decision will be made at that point in time.  For now I will put the patient on aspirin and statin..    I discussed the plan of care with patient, bedside RN, and emergency room physician Dr. Nelson Bravo .    Review of Systems  Review of Systems   Constitutional: Negative.  Negative for chills, diaphoresis and fever.   HENT: Negative.  Negative for hearing loss, nosebleeds and sore throat.    Eyes: " Negative.  Negative for double vision.   Respiratory: Negative.  Negative for cough, hemoptysis and wheezing.    Cardiovascular: Negative.  Negative for chest pain, palpitations and leg swelling.   Gastrointestinal: Negative.  Negative for abdominal pain, blood in stool, constipation, diarrhea, heartburn, nausea and vomiting.   Genitourinary: Negative.  Negative for frequency, hematuria and urgency.   Musculoskeletal: Negative.  Negative for joint pain.   Skin: Negative.  Negative for itching and rash.   Neurological:  Positive for dizziness, tremors and loss of consciousness. Negative for focal weakness, seizures and headaches.   Endo/Heme/Allergies: Negative.  Does not bruise/bleed easily.   Psychiatric/Behavioral:  Negative for suicidal ideas. The patient is nervous/anxious.    All other systems reviewed and are negative.      Past Medical History   has no past medical history on file.    Surgical History   has a past surgical history that includes bowel resection laparoscopic (4/10/2009); lysis adhesions general (4/10/2009); and laparoscopy (4/10/2009).     Family History  family history is not on file.   Family history reviewed with patient. There is no family history that is pertinent to the chief complaint.     Social History   reports that she has never smoked. She has never used smokeless tobacco. She reports current alcohol use. She reports that she does not use drugs.    Allergies  Allergies   Allergen Reactions    No Known Drug Allergy        Medications  Prior to Admission Medications   Prescriptions Last Dose Informant Patient Reported? Taking?   Ascorbic Acid (VITAMIN C PO) 3/10/2025 at  9:00 AM Patient Yes Yes   Sig: Take 1 Tablet by mouth every day.   Cyanocobalamin (B-12 PO) 3/11/2025 at  9:00 AM Patient Yes Yes   Sig: Take 1 Tablet by mouth every day.   Multiple Vitamins-Minerals (PRESERVISION AREDS 2 PO) 3/10/2025 at  9:00 AM Patient Yes Yes   Sig: Take 1 Capsule by mouth every day.   NAPROXEN PO  3/8/2025 Patient Yes Yes   Sig: Take 2 Tablets by mouth 2 times a day as needed. (OTC)  Indications: Pain   Nutritional Supplements (ESTROVEN PO) 3/11/2025 at  9:00 AM Patient Yes Yes   Sig: Take 1 Tablet by mouth every day.   VITAMIN E PO 3/10/2025 at  9:00 AM Patient Yes Yes   Sig: Take 1 Capsule by mouth every day.   ferrous sulfate 325 (65 Fe) MG tablet 3/11/2025 at  9:00 AM Patient Yes Yes   Sig: Take 325 mg by mouth every day. Pt started on 2/27/2025      Facility-Administered Medications: None       Physical Exam  Pulse:  [56] 56  Resp:  [16] 16  BP: (137)/(65) 137/65  SpO2:  [97 %] 97 %  Blood Pressure : 137/65       Pulse: (!) 56   Respiration: 16   Pulse Oximetry: 97 %       Physical Exam  Vitals and nursing note reviewed. Exam conducted with a chaperone present.   Constitutional:       General: She is awake.      Appearance: Normal appearance. She is well-developed, well-groomed and normal weight. She is not ill-appearing.   HENT:      Head: Normocephalic and atraumatic.      Jaw: There is normal jaw occlusion. No trismus.      Salivary Glands: Right salivary gland is not tender. Left salivary gland is not tender.      Right Ear: External ear normal.      Left Ear: External ear normal.      Nose: Nose normal.      Mouth/Throat:      Mouth: Mucous membranes are moist.      Pharynx: Oropharynx is clear. No oropharyngeal exudate or posterior oropharyngeal erythema.   Eyes:      General: Lids are normal. Vision grossly intact.         Right eye: No discharge.         Left eye: No discharge.      Extraocular Movements: Extraocular movements intact.      Conjunctiva/sclera: Conjunctivae normal.      Right eye: Right conjunctiva is not injected. No exudate.     Left eye: Left conjunctiva is not injected. No exudate.     Pupils: Pupils are equal, round, and reactive to light.   Neck:      Thyroid: No thyroid mass.      Vascular: No carotid bruit, hepatojugular reflux or JVD.      Trachea: No abnormal tracheal  secretions or tracheal deviation.   Cardiovascular:      Rate and Rhythm: Regular rhythm. Bradycardia present. Occasional Extrasystoles are present.     Pulses: Normal pulses.      Heart sounds: Normal heart sounds. No murmur heard.     No friction rub.   Pulmonary:      Effort: Pulmonary effort is normal.      Breath sounds: Normal breath sounds. No wheezing or rhonchi.   Abdominal:      General: Abdomen is flat. Bowel sounds are normal.      Palpations: Abdomen is soft.      Tenderness: There is no abdominal tenderness. There is no right CVA tenderness or left CVA tenderness.      Hernia: No hernia is present.   Musculoskeletal:         General: Normal range of motion.      Cervical back: Full passive range of motion without pain, normal range of motion and neck supple. No rigidity. No muscular tenderness.      Right lower leg: No edema.      Left lower leg: No edema.   Lymphadenopathy:      Head:      Right side of head: No submental adenopathy.      Left side of head: No submental adenopathy.      Cervical:      Right cervical: No superficial cervical adenopathy.     Left cervical: No superficial cervical adenopathy.      Upper Body:      Right upper body: No supraclavicular adenopathy.      Left upper body: No supraclavicular adenopathy.   Skin:     General: Skin is warm and dry.      Capillary Refill: Capillary refill takes less than 2 seconds.      Coloration: Skin is not cyanotic or pale.      Findings: No abrasion or bruising.   Neurological:      General: No focal deficit present.      Mental Status: She is alert and oriented to person, place, and time. Mental status is at baseline.      GCS: GCS eye subscore is 4. GCS verbal subscore is 5. GCS motor subscore is 6.      Cranial Nerves: No cranial nerve deficit.      Sensory: No sensory deficit.      Motor: Motor function is intact.      Deep Tendon Reflexes:      Reflex Scores:       Tricep reflexes are 2+ on the right side and 2+ on the left side.        "Bicep reflexes are 2+ on the right side and 2+ on the left side.       Brachioradialis reflexes are 2+ on the right side and 2+ on the left side.       Patellar reflexes are 2+ on the right side and 2+ on the left side.       Achilles reflexes are 2+ on the right side and 2+ on the left side.  Psychiatric:         Attention and Perception: Attention and perception normal.         Mood and Affect: Mood normal.         Speech: Speech normal.         Behavior: Behavior normal. Behavior is cooperative.         Thought Content: Thought content normal.         Cognition and Memory: Cognition and memory normal.         Judgment: Judgment normal.         Laboratory:  Recent Labs     03/11/25  1100   WBC 5.5   RBC 4.90   HEMOGLOBIN 12.5   HEMATOCRIT 40.5   MCV 82.7   MCH 25.5*   MCHC 30.9*   RDW 54.8*   PLATELETCT 318   MPV 9.6     Recent Labs     03/11/25  1100   SODIUM 138   POTASSIUM 3.9   CHLORIDE 103   CO2 23   GLUCOSE 107*   BUN 17   CREATININE 0.71   CALCIUM 10.0     Recent Labs     03/11/25  1100   ALTSGPT 16   ASTSGOT 19   ALKPHOSPHAT 102*   TBILIRUBIN 0.3   GLUCOSE 107*         No results for input(s): \"NTPROBNP\" in the last 72 hours.      Recent Labs     03/11/25  1100   TROPONINT <6       Imaging:  CT-HEAD W/O   Final Result      No noncontrast CT evidence of acute intracranial hemorrhage.      Mild white matter hypodensity is present.  This is a nonspecific finding which usually is found to represent chronic microvascular disease in patient's of this demographic.  Demyelination, age indeterminant ischemia and gliosis are also common    possibilities.      Improving sinus inflammatory disease      DX-CHEST-PORTABLE (1 VIEW)   Final Result      Mild cardiac silhouette enlargement, aortic ectasia but no evidence of edema      EC-ECHOCARDIOGRAM COMPLETE W/O CONT    (Results Pending)   NM-CARDIAC STRESS TEST    (Results Pending)       X-Ray:  I have personally reviewed the images and compared with prior images.  EKG: "  I have personally reviewed the images and compared with prior images.    Assessment/Plan:  Justification for Admission Status  I anticipate this patient is appropriate for observation status at this time because patient has syncopal event will require 23 hours or less of hospitalization    Patient will need a Telemetry bed on MEDICAL service .  The need is secondary to syncope.    * Syncope and collapse- (present on admission)  Assessment & Plan  Patient had 3 episodes of near syncope after a 2 mile walk.  She usually does a 4 mile walk.  She became so dizzy she had to sit down and she became very concerned and called the neighbors who then took her into the hospital.  She does tell me that about 4 years ago she did have an episode of what sounds like atrial fibrillation with rapid ventricular response at which point they were even thinking of doing an ablation for her through the electrophysiologist.  She never did have that procedure because everything went back to normal.  I will order a CAT scan of the head make sure that there is nothing neurological going on  Patient is complaining of just generalized weakness  Patient is bradycardic  I will admit her to telemetric and under telemetric monitoring will run serial cardiac enzymes followed by a stress test in the morning  I will obtain an echocardiogram  I will obtain a.m. cortisol level, I will obtain thyroid functions  I will check hemoglobin A1c level    Bradycardia- (present on admission)  Assessment & Plan  Patient has bradycardia which appears to be symptomatic as the patient had 3 episodes of syncope  I will obtain an echocardiogram  I will put her on telemetric monitoring  I will evaluate serial troponins  I will do a stress test on the patient    Hyperglycemia- (present on admission)  Assessment & Plan  I will obtain a hemoglobin A1c level  Blood sugar slightly elevated.    Elevated alkaline phosphatase level- (present on admission)  Assessment &  Plan  I will order a GGT level if elevated the patient will need evaluation of her bone system if normal the patient will need an ultrasound of the liver        VTE prophylaxis: SCDs/TEDs

## 2025-03-11 NOTE — ED TRIAGE NOTES
"Chief Complaint   Patient presents with    Lightheadedness     Started this am  Felt lightheaded while standing and had to quickly sit down  \"Really like I am going to pass out\"     Dizziness     \"Everything is spinning\"       "

## 2025-03-11 NOTE — ED NOTES
Medication history reviewed with pt. Med rec is complete.  Allergies reviewed, per pt    Patient has not had any outpatient antibiotics in the last 30 days.    Pt is not on any anticoagulants      Dispense history available in EPIC? YES

## 2025-03-11 NOTE — ED PROVIDER NOTES
"ED Provider Note    CHIEF COMPLAINT  Chief Complaint   Patient presents with    Lightheadedness     Started this am  Felt lightheaded while standing and had to quickly sit down  \"Really like I am going to pass out\"     Dizziness            EXTERNAL RECORDS REVIEWED  Outpatient labs & studies on July 7, 2009 the patient had an MRI brain that was negative    HPI/ROS  LIMITATION TO HISTORY   Select: : None  OUTSIDE HISTORIAN(S):  None    Marely Coon is a 70 y.o. female who presents with past medical history significant for high cholesterol, she is adopted and does not know her family history, years ago she had an episode of a flutter with RVR that converted, she has never had a recurrence of that, she said she felt palpitations at that time.  She felt her heart racing.  Today she had episodes of presyncope.  The nurses note says everything is spinning although the patient denies any feeling of motion or feeling of spinning.  Patient did not experience chest pain, no focal neurologic deficits.  No headache.  She reports she has never had symptoms like this before.    PAST MEDICAL HISTORY   High cholesterol, a flutter 1 time    SURGICAL HISTORY   has a past surgical history that includes bowel resection laparoscopic (4/10/2009); lysis adhesions general (4/10/2009); and laparoscopy (4/10/2009).    FAMILY HISTORY  History reviewed. No pertinent family history.    SOCIAL HISTORY  Social History     Tobacco Use    Smoking status: Never    Smokeless tobacco: Never   Vaping Use    Vaping status: Never Used   Substance and Sexual Activity    Alcohol use: Yes    Drug use: No    Sexual activity: Not on file       CURRENT MEDICATIONS  Vitamin B12, Estroven, iron, multiple vitamin      ALLERGIES  Allergies   Allergen Reactions    No Known Drug Allergy        PHYSICAL EXAM  VITAL SIGNS: /65   Pulse (!) 56   Resp 16   Wt 57 kg (125 lb 10.6 oz)   LMP 03/12/2008   SpO2 97%   BMI 21.57 kg/m²      Constitutional: " Alert.  HENT: No signs of trauma, Bilateral external ears normal, Nose normal.   Eyes: Pupils are equal and reactive, Conjunctiva normal, Non-icteric.   Neck: Normal range of motion, No tenderness, Supple, No stridor.   Lymphatic: No lymphadenopathy noted.   Cardiovascular: Cardiac with no murmurs.  Thorax & Lungs: Normal breath sounds, No respiratory distress, No wheezing, No chest tenderness.   Abdomen: Bowel sounds normal, Soft, No tenderness, No peritoneal signs, No masses, No pulsatile masses.   Skin: Warm, Dry, No erythema, No rash.   Back: No bony tenderness, No CVA tenderness.   Extremities: Intact distal pulses, No edema, No tenderness, No cyanosis.  Musculoskeletal: Good range of motion in all major joints. No tenderness to palpation or major deformities noted.   Neurologic: Alert , Normal motor function, Normal sensory function, No focal deficits noted.   Psychiatric: Affect normal, Judgment normal, Mood normal.      EKG/LABS    Sinus bradycardia rate 50  Left axis deviation -39  intervals normal  Nonspecific ST-T wave changes  Compared to ECG 11/06/2020 07:57:49  Left-axis deviation now present  Left anterior fascicular block no longer present  My impression of this EKG, sinus bradycardia, does not meet STEMI criteria at this time  I have independently interpreted this EKG    RADIOLOGY/PROCEDURES   I have independently interpreted the diagnostic imaging associated with this visit and am waiting the final reading from the radiologist.   My preliminary interpretation is as follows: Chest x-ray negative for acute disease    Radiologist interpretation:  DX-CHEST-PORTABLE (1 VIEW)   Final Result      Mild cardiac silhouette enlargement, aortic ectasia but no evidence of edema      CT-HEAD W/O    (Results Pending)   EC-ECHOCARDIOGRAM COMPLETE W/O CONT    (Results Pending)   NM-CARDIAC STRESS TEST    (Results Pending)       COURSE & MEDICAL DECISION MAKING    ASSESSMENT, COURSE AND PLAN  Care Narrative:      Patient presents with presyncope.  Her symptoms do not indicate vertigo to me.  She is bradycardic.  She has no chest pain.  EKG was ordered, labs ordered, the patient was placed on a monitor.    Patient's EKG is consistent with sinus bradycardia.  Her labs are unremarkable.  Troponin is normal.  At this point I will ask the hospitalist to assess the patient for hospitalization.  She has never had a cardiac stress test.          ADDITIONAL PROBLEMS MANAGED  Bradycardia, presyncope    DISPOSITION AND DISCUSSIONS  I have discussed management of the patient with the following physicians and ANTONIO's: [facility]    Discussion of management with other [facility] or appropriate source(s): None    Escalation of care considered, and ultimately not performed: Patient's EKG did not indicate STEMI, the patient did not have to be transferred for Cath Lab.    Barriers to care at this time, including but not limited to:  None .     Decision tools and prescription drugs considered including, but not limited to:  IV antibiotics given in the emergency department .    FINAL DIAGNOSIS  1. Syncope, unspecified syncope type    2.      Bradycardia      Electronically signed by: Jose F Benton M.D., 3/11/2025 10:25 AM

## 2025-03-11 NOTE — ED NOTES
1050 pcxr at bedside  1100 Iv placed , labs drawn and taken to lab. poc update given to pt, results pending at this time  1145 All results back, chart up for MD for re evaluation. poc update given to pt. No further questions at this time. Further orders and dispo pending  1200 pt to be admitted to hospital   Further orders pending

## 2025-03-11 NOTE — CARE PLAN
Problem: Knowledge Deficit - Standard  Goal: Patient and family/care givers will demonstrate understanding of plan of care, disease process/condition, diagnostic tests and medications  Outcome: Progressing     Problem: Psychosocial  Goal: Patient's level of anxiety will decrease  Outcome: Progressing  Goal: Patient's ability to verbalize feelings about condition will improve  Outcome: Progressing  Goal: Patient's ability to re-evaluate and adapt role responsibilities will improve  Outcome: Progressing  Goal: Patient and family will demonstrate ability to cope with life altering diagnosis and/or procedure  Outcome: Progressing  Goal: Spiritual and cultural needs incorporated into hospitalization  Outcome: Progressing     Problem: Communication  Goal: The ability to communicate needs accurately and effectively will improve  Outcome: Progressing     Problem: Hemodynamics  Goal: Patient's hemodynamics, fluid balance and neurologic status will be stable or improve  Outcome: Progressing   The patient is Stable - Low risk of patient condition declining or worsening         Progress made toward(s) clinical / shift goals:      Patient is not progressing towards the following goals:

## 2025-03-11 NOTE — ASSESSMENT & PLAN NOTE
Patient has bradycardia which appears to be symptomatic as the patient had 3 episodes of syncope  I will obtain an echocardiogram  I will put her on telemetric monitoring  I will evaluate serial troponins  I will do a stress test on the patient

## 2025-03-11 NOTE — ASSESSMENT & PLAN NOTE
I will order a GGT level if elevated the patient will need evaluation of her bone system if normal the patient will need an ultrasound of the liver

## 2025-03-12 ENCOUNTER — APPOINTMENT (OUTPATIENT)
Dept: RADIOLOGY | Facility: MEDICAL CENTER | Age: 70
End: 2025-03-12
Attending: HOSPITALIST
Payer: MEDICARE

## 2025-03-12 ENCOUNTER — TELEPHONE (OUTPATIENT)
Dept: CARDIOLOGY | Facility: MEDICAL CENTER | Age: 70
End: 2025-03-12

## 2025-03-12 ENCOUNTER — NON-PROVIDER VISIT (OUTPATIENT)
Dept: CARDIOLOGY | Facility: MEDICAL CENTER | Age: 70
End: 2025-03-12
Attending: STUDENT IN AN ORGANIZED HEALTH CARE EDUCATION/TRAINING PROGRAM
Payer: MEDICARE

## 2025-03-12 VITALS
BODY MASS INDEX: 21.34 KG/M2 | HEIGHT: 64 IN | DIASTOLIC BLOOD PRESSURE: 77 MMHG | WEIGHT: 125 LBS | HEART RATE: 56 BPM | OXYGEN SATURATION: 97 % | RESPIRATION RATE: 18 BRPM | TEMPERATURE: 97.3 F | SYSTOLIC BLOOD PRESSURE: 140 MMHG

## 2025-03-12 DIAGNOSIS — R55 SYNCOPE AND COLLAPSE: ICD-10-CM

## 2025-03-12 DIAGNOSIS — Z71.9 ENCOUNTER FOR CONSULTATION: ICD-10-CM

## 2025-03-12 PROBLEM — R00.1 BRADYCARDIA: Status: RESOLVED | Noted: 2025-03-11 | Resolved: 2025-03-12

## 2025-03-12 LAB
ANION GAP SERPL CALC-SCNC: 10 MMOL/L (ref 7–16)
BUN SERPL-MCNC: 18 MG/DL (ref 8–22)
CALCIUM SERPL-MCNC: 9.1 MG/DL (ref 8.4–10.2)
CHLORIDE SERPL-SCNC: 106 MMOL/L (ref 96–112)
CHOLEST SERPL-MCNC: 193 MG/DL (ref 100–199)
CO2 SERPL-SCNC: 22 MMOL/L (ref 20–33)
CORTIS SERPL-MCNC: 12.2 UG/DL (ref 0–23)
CREAT SERPL-MCNC: 0.65 MG/DL (ref 0.5–1.4)
EKG IMPRESSION: NORMAL
EKG IMPRESSION: NORMAL
ERYTHROCYTE [DISTWIDTH] IN BLOOD BY AUTOMATED COUNT: 56.7 FL (ref 35.9–50)
GFR SERPLBLD CREATININE-BSD FMLA CKD-EPI: 93 ML/MIN/1.73 M 2
GLUCOSE SERPL-MCNC: 90 MG/DL (ref 65–99)
HCT VFR BLD AUTO: 37.2 % (ref 37–47)
HDLC SERPL-MCNC: 57 MG/DL
HGB BLD-MCNC: 11.6 G/DL (ref 12–16)
LDLC SERPL CALC-MCNC: 104 MG/DL
MCH RBC QN AUTO: 26.1 PG (ref 27–33)
MCHC RBC AUTO-ENTMCNC: 31.2 G/DL (ref 32.2–35.5)
MCV RBC AUTO: 83.6 FL (ref 81.4–97.8)
PLATELET # BLD AUTO: 285 K/UL (ref 164–446)
PMV BLD AUTO: 10.2 FL (ref 9–12.9)
POTASSIUM SERPL-SCNC: 4.1 MMOL/L (ref 3.6–5.5)
RBC # BLD AUTO: 4.45 M/UL (ref 4.2–5.4)
SODIUM SERPL-SCNC: 138 MMOL/L (ref 135–145)
TRIGL SERPL-MCNC: 159 MG/DL (ref 0–149)
WBC # BLD AUTO: 5.5 K/UL (ref 4.8–10.8)

## 2025-03-12 PROCEDURE — 99447 NTRPROF PH1/NTRNET/EHR 11-20: CPT | Performed by: STUDENT IN AN ORGANIZED HEALTH CARE EDUCATION/TRAINING PROGRAM

## 2025-03-12 PROCEDURE — 700102 HCHG RX REV CODE 250 W/ 637 OVERRIDE(OP): Performed by: HOSPITALIST

## 2025-03-12 PROCEDURE — 93246 EXT ECG>7D<15D RECORDING: CPT

## 2025-03-12 PROCEDURE — 96372 THER/PROPH/DIAG INJ SC/IM: CPT | Mod: XU

## 2025-03-12 PROCEDURE — 700105 HCHG RX REV CODE 258: Performed by: HOSPITALIST

## 2025-03-12 PROCEDURE — 93010 ELECTROCARDIOGRAM REPORT: CPT | Mod: 76 | Performed by: INTERNAL MEDICINE

## 2025-03-12 PROCEDURE — 80061 LIPID PANEL: CPT

## 2025-03-12 PROCEDURE — 93018 CV STRESS TEST I&R ONLY: CPT | Performed by: INTERNAL MEDICINE

## 2025-03-12 PROCEDURE — 80048 BASIC METABOLIC PNL TOTAL CA: CPT

## 2025-03-12 PROCEDURE — 700111 HCHG RX REV CODE 636 W/ 250 OVERRIDE (IP): Performed by: HOSPITALIST

## 2025-03-12 PROCEDURE — 82533 TOTAL CORTISOL: CPT

## 2025-03-12 PROCEDURE — 36415 COLL VENOUS BLD VENIPUNCTURE: CPT

## 2025-03-12 PROCEDURE — A9270 NON-COVERED ITEM OR SERVICE: HCPCS | Performed by: HOSPITALIST

## 2025-03-12 PROCEDURE — 85027 COMPLETE CBC AUTOMATED: CPT

## 2025-03-12 PROCEDURE — 78452 HT MUSCLE IMAGE SPECT MULT: CPT | Mod: 26 | Performed by: INTERNAL MEDICINE

## 2025-03-12 PROCEDURE — 97165 OT EVAL LOW COMPLEX 30 MIN: CPT

## 2025-03-12 PROCEDURE — 93005 ELECTROCARDIOGRAM TRACING: CPT | Mod: TC,XE | Performed by: HOSPITALIST

## 2025-03-12 PROCEDURE — 93010 ELECTROCARDIOGRAM REPORT: CPT | Performed by: INTERNAL MEDICINE

## 2025-03-12 PROCEDURE — G0378 HOSPITAL OBSERVATION PER HR: HCPCS

## 2025-03-12 PROCEDURE — A9502 TC99M TETROFOSMIN: HCPCS

## 2025-03-12 RX ADMIN — SODIUM CHLORIDE: 9 INJECTION, SOLUTION INTRAVENOUS at 05:13

## 2025-03-12 RX ADMIN — REGADENOSON 0.4 MG: 0.08 INJECTION, SOLUTION INTRAVENOUS at 08:57

## 2025-03-12 RX ADMIN — HEPARIN SODIUM 5000 UNITS: 5000 INJECTION, SOLUTION INTRAVENOUS; SUBCUTANEOUS at 05:08

## 2025-03-12 RX ADMIN — ASPIRIN 81 MG: 81 TABLET, COATED ORAL at 05:08

## 2025-03-12 ASSESSMENT — COGNITIVE AND FUNCTIONAL STATUS - GENERAL
DAILY ACTIVITIY SCORE: 24
SUGGESTED CMS G CODE MODIFIER DAILY ACTIVITY: CH

## 2025-03-12 ASSESSMENT — ACTIVITIES OF DAILY LIVING (ADL): TOILETING: INDEPENDENT

## 2025-03-12 ASSESSMENT — PAIN DESCRIPTION - PAIN TYPE: TYPE: ACUTE PAIN

## 2025-03-12 NOTE — HOSPITAL COURSE
Marely Coon has past ministry includes hyperlipidemia, she has had a previous episode of A-fib/RVR.  Patient presented to the emergency room on 3/11/2025 with complaints of 3 near syncopal episodes after a 2 mile walk.  CT imaging of the head showed no evidence of acute intracranial abnormality.  The patient was hospitalized for cardiac monitoring and additional testing including an echocardiogram and stress test.

## 2025-03-12 NOTE — DISCHARGE SUMMARY
"Discharge Summary    CHIEF COMPLAINT ON ADMISSION  Chief Complaint   Patient presents with    Lightheadedness     Started this am  Felt lightheaded while standing and had to quickly sit down  \"Really like I am going to pass out\"     Dizziness     \"Everything is spinning\"       Reason for Admission  Near Syncopal     Admission Date  3/11/2025    CODE STATUS  Full Code    HPI & HOSPITAL COURSE  This is a 70 y.o. female here with near syncope     Marely Coon has past ministry includes hyperlipidemia, she has had a previous episode of A-fib/RVR.  Patient presented to the emergency room on 3/11/2025 with complaints of near syncope.  The patient states that she had just completed walking about two miles and was standing in her house.  She suddenly felt dizzy and as though she was going to pass out and had to brace with her arms.  She got down on the floor for a while. When she stood up again, the symptoms returned.  She did not actually loose consciousness, denies  palpitations, she did not injure herself during the event.  Did continue to have some mild symptoms of lightheadedness for some time but symptoms resolved before IV fluids were started.    The patient has been monitored on telemetry.  She does have some bradycardia while resting with heart rates to the 40's.  She has been assymptomatic and blood pressure has been adequate.  She is currently ambulatory without symptoms.    Echocardiogram and nuclear medicine stress test are unrevealing.    Given the patient's history of tachyarrhythmia and symptoms that are not easily explained by other etiologies, I am recommending that the patient have outpatient cardiac monitoring.  She will go to the cardiology clinic today and have a Zio patch placed.    Recommend that she follow-up with her primary care physician as well as gastroenterology for a colonoscopy that was ordered prior to this hospitalization    CT imaging of the head showed no evidence of acute " intracranial abnormality.  The patient was hospitalized for cardiac monitoring and additional testing including an echocardiogram and stress test.    Therefore, she is discharged in good and stable condition to home with close outpatient follow-up.    Discharge Date  3/12/2025    FOLLOW UP ITEMS POST DISCHARGE  Follow-up with [facility] for Zio patch, follow-up with primary care    DISCHARGE DIAGNOSES  Principal Problem (Resolved):    Syncope and collapse (POA: Yes)  Active Problems:    Elevated alkaline phosphatase level (POA: Yes)    Hyperglycemia (POA: Yes)  Resolved Problems:    Bradycardia (POA: Yes)      FOLLOW UP  Future Appointments   Date Time Provider Department Center   3/31/2025  9:00 AM IZABEL CRUZCOLBYACMERON [facility]     No follow-up provider specified.    MEDICATIONS ON DISCHARGE     Medication List        CONTINUE taking these medications        Instructions   B-12 PO   Take 1 Tablet by mouth every day.  Dose: 1 Tablet     ESTROVEN PO   Take 1 Tablet by mouth every day.  Dose: 1 Tablet     ferrous sulfate 325 (65 Fe) MG tablet   Take 325 mg by mouth every day. Pt started on 2/27/2025  Dose: 325 mg     NAPROXEN PO   Take 2 Tablets by mouth 2 times a day as needed. (OTC)  Indications: Pain  Dose: 2 Tablet     PRESERVISION AREDS 2 PO   Take 1 Capsule by mouth every day.  Dose: 1 Capsule     VITAMIN C PO   Take 1 Tablet by mouth every day.  Dose: 1 Tablet     VITAMIN E PO   Take 1 Capsule by mouth every day.  Dose: 1 Capsule              Allergies  Allergies   Allergen Reactions    No Known Drug Allergy        DIET  Orders Placed This Encounter   Procedures    Diet Order Diet: Cardiac; Miscellaneous modifications: (optional): No Decaf, No Caffeine(for test)     Standing Status:   Standing     Number of Occurrences:   1     Diet::   Cardiac [6]     Miscellaneous modifications: (optional):   No Decaf, No Caffeine(for test) [11]       ACTIVITY  As tolerated.  Weight bearing as  tolerated    CONSULTATIONS  None    PROCEDURES  3/12/2025 Nuclear Medicine Stress Test:   NUCLEAR IMAGING INTERPRETATION   No evidence of significant jeopardized viable myocardium or prior myocardial    infarction.   Normal left ventricular size, ejection fraction, and wall motion.   ECG INTERPRETATION   Negative stress ECG for ischemia.      Echocardiogram 3/12/2025:  CONCLUSIONS  Compared to the images of the prior study on 8/26/2020 - there is no   significant change.  Normal left ventricular systolic function.  The left ventricular ejection fraction is visually estimated to be 60%.  Normal diastolic function.  Normal right ventricular systolic function.  No significant valvular abnormalities.        LABORATORY  Lab Results   Component Value Date    SODIUM 138 03/12/2025    POTASSIUM 4.1 03/12/2025    CHLORIDE 106 03/12/2025    CO2 22 03/12/2025    GLUCOSE 90 03/12/2025    BUN 18 03/12/2025    CREATININE 0.65 03/12/2025    CREATININE 0.9 04/10/2009        Lab Results   Component Value Date    WBC 5.5 03/12/2025    HEMOGLOBIN 11.6 (L) 03/12/2025    HEMATOCRIT 37.2 03/12/2025    PLATELETCT 285 03/12/2025        Total time of the discharge process exceeds 36 minutes.

## 2025-03-12 NOTE — CARE PLAN
The patient is Stable - Low risk of patient condition declining or worsening    Shift Goals  Clinical Goals: Monitor for dizziness, vitals, and labs  Patient Goals: Updates    Progress made toward(s) clinical / shift goals:      Problem: Knowledge Deficit - Standard  Goal: Patient and family/care givers will demonstrate understanding of plan of care, disease process/condition, diagnostic tests and medications  Outcome: Progressing  Note: Patient updated on POC, all questions answered.      Problem: Psychosocial  Goal: Patient's level of anxiety will decrease  Outcome: Progressing     Problem: Communication  Goal: The ability to communicate needs accurately and effectively will improve  Outcome: Progressing       Patient is not progressing towards the following goals:

## 2025-03-12 NOTE — THERAPY
Occupational Therapy   Initial Evaluation     Patient Name: Marely Coon  Age:  70 y.o., Sex:  female  Medical Record #: [id number]  Today's Date: 3/12/2025     Assessment  Patient is 70 y.o. female admitted with dizziness, near syncope. Pt is A&Ox4, pleasant and cooperative. Reports symptoms have resolved. Demonstrates good UB strength and coordination, good safety, good activity tolerance, good balance during AM ADL's. See below for CLOF. Pt lives alone with her dog, walks 4-5 miles/day. No further OT needs.          Plan  1x only OT Eval. Pt has no PT needs.     DC Equipment Recommendations: (P) None  Discharge Recommendations: (P) Anticipate that the patient will have no further occupational therapy needs after discharge from the hospital     Subjective  Agreeable     Objective     03/12/25 1058   Prior Living Situation   Prior Services None   Housing / Facility 2 Story House  (only stays on 1st floor)   Steps Into Home 2   Bathroom Set up Walk In Shower;Shower Chair   Equipment Owned Tub / Shower Seat   Lives with - Patient's Self Care Capacity Alone and Able to Care For Self   Comments Walks 4-5 miles/day   Prior Level of ADL Function   Self Feeding Independent   Grooming / Hygiene Independent   Bathing Independent   Dressing Independent   Toileting Independent   Prior Level of IADL Function   Medication Management Independent   Laundry Independent   Kitchen Mobility Independent   Finances Independent   Home Management Independent   Shopping Independent   Prior Level Of Mobility Independent Without Device in Home;Independent Without Device in Community   Driving / Transportation Driving Independent   Occupation (Pre-Hospital Vocational) Retired Due To Age   Leisure Interests Pets;Exercise   History of Falls   History of Falls No   Vitals   O2 Delivery Device None - Room Air   Pain 0 - 10 Group   Therapist Pain Assessment 0;Nurse Notified   Cognition    Cognition / Consciousness WDL   Level of  Consciousness Alert   Comments Ox4   Passive ROM Upper Body   Passive ROM Upper Body WDL   Active ROM Upper Body   Active ROM Upper Body  WDL   Strength Upper Body   Upper Body Strength  WDL   Sensation Upper Body   Upper Extremity Sensation  WDL   Upper Body Muscle Tone   Upper Body Muscle Tone  WDL   Coordination Upper Body   Coordination WDL   Balance Assessment   Sitting Balance (Static) Good   Sitting Balance (Dynamic) Good   Standing Balance (Static) Good   Standing Balance (Dynamic) Good   Weight Shift Sitting Good   Weight Shift Standing Good   Bed Mobility    Supine to Sit Independent   Sit to Supine Independent   ADL Assessment   Eating Independent   Grooming Independent   Upper Body Dressing Independent   Lower Body Dressing Supervision   Toileting Supervision   How much help from another person does the patient currently need...   Putting on and taking off regular lower body clothing? 4   Bathing (including washing, rinsing, and drying)? 4   Toileting, which includes using a toilet, bedpan, or urinal? 4   Putting on and taking off regular upper body clothing? 4   Taking care of personal grooming such as brushing teeth? 4   Eating meals? 4   6 Clicks Daily Activity Score 24   mRS Prior to admission   Prior to admission mRS 0   Modified [place] (mRS)   Modified [place] Score 0   Functional Mobility   Sit to Stand Independent   Bed, Chair, Wheelchair Transfer Supervised   Toilet Transfers Supervised   Tub / Shower Transfers Supervised   Transfer Method Stand Step   Comments steady without AD   Visual Perception   Visual Perception  WDL   Comments no c/o dizziness   Activity Tolerance   Comments functional   Patient / Family Goals   Patient / Family Goal #1 Home to her dog   Education Group   Education Provided Home Safety   Role of Occupational Therapist Patient Response Patient;Verbal Demonstration;Explanation;Acceptance   Home Safety Patient Response Patient;Verbal Demonstration;Explanation;Acceptance    Anticipated Discharge Equipment and Recommendations   DC Equipment Recommendations None   Discharge Recommendations Anticipate that the patient will have no further occupational therapy needs after discharge from the hospital   Interdisciplinary Plan of Care Collaboration   IDT Collaboration with  Nursing;Physical Therapist   Patient Position at End of Therapy Edge of Bed;Call Light within Reach;Tray Table within Reach;Phone within Reach   Collaboration Comments Ot Alexis. Pt has no PT needs,

## 2025-03-12 NOTE — PROGRESS NOTES
Patient enrolled in the 14 day Zio Holter per [facility]   In clinic hook up, monitor S/N [id number]  Pending EOS

## 2025-03-12 NOTE — THERAPY
Physical Therapy Contact Note    Patient Name: Marely Coon  Age:  70 y.o., Sex:  female  Medical Record #: [id number]  Today's Date: 3/12/2025           03/12/25 1145   Initial Contact Note    Initial Contact Note Order Received and Verified. Physical Therapy Evaluation NOT Completed Because Patient Does Not Require Acute Physical Therapy at this Time.   Interdisciplinary Plan of Care Collaboration   IDT Collaboration with  Nursing;Occupational Therapist   Collaboration Comments Per patient's care team, pt's syptoms have resolved and pt is mobilizing w/o difficulty.   Skilled inpatient PT is no longer indicated.  Staff notified and order completed.

## 2025-03-12 NOTE — CARE PLAN
The patient is Stable - Low risk of patient condition declining or worsening    Shift Goals  Clinical Goals: q4 neuros, stress test, monitor HR  Patient Goals: Updates    Progress made toward(s) clinical / shift goals: Pt denies any pain, pt down to stress test     Problem: Knowledge Deficit - Standard  Goal: Patient and family/care givers will demonstrate understanding of plan of care, disease process/condition, diagnostic tests and medications  Outcome: Progressing     Problem: Communication  Goal: The ability to communicate needs accurately and effectively will improve  Outcome: Progressing     Problem: Hemodynamics  Goal: Patient's hemodynamics, fluid balance and neurologic status will be stable or improve  Outcome: Progressing       Patient is not progressing towards the following goals:

## 2025-03-12 NOTE — PROGRESS NOTES
Telemetry Shift Summary     Rhythm SR/SB  HR Range 50-72  Ectopy roPVC/rPAC/rBig/rTrig  Measurements  0.16/0.10/0.43     Normal Values  Rhythm SR  HR Range    Measurements 0.12-0.20 / 0.06-0.10  / 0.30-0.52

## 2025-03-12 NOTE — TELEPHONE ENCOUNTER
E-Consult Response     E-Consulted was requested by Dr. Beach.  Consent for E-consult was obtained by Dr. Beach careful review of the patient's information available in the medical record, the following are my findings and recommendations:    Reason for consult:  Syncope    Summary of data reviewed: Marely Coon is a 70 year old woman with history of Aflutter who presented with syncope. I personally reviewed the EKG, which showed sinus bradycardia. Her HR on telemetry was 50-70s without evidence of tachy or bradyarrhythmias.  Nuclear stress test was negative for prior MI or reversible ischemia. Echo was without significant structural abnormalities.     Recommendations: Recommend event monitor and ambulating patient prior to discharge.     E-Consult Time: 12 minutes were spent with >50% of the total time spent discussing plan of care with requesting physician (Use code [id number])    Sachin Dickerson M.D.

## 2025-03-12 NOTE — PROGRESS NOTES
4 Eyes Skin Assessment Completed by ASHLEY Mcdonnell and ASHLEY Parrish.    Head WDL  Ears WDL  Nose WDL  Mouth WDL  Neck WDL  Breast/Chest WDL  Shoulder Blades WDL  Spine WDL  (R) Arm/Elbow/Hand WDL  (L) Arm/Elbow/Hand WDL  Abdomen WDL  Groin WDL  Scrotum/Coccyx/Buttocks WDL  (R) Leg WDL  (L) Leg WDL  (R) Heel/Foot/Toe WDL  (L) Heel/Foot/Toe WDL          Devices In Places Tele Box      Interventions In Place Pillows    Possible Skin Injury No    Pictures Uploaded Into Epic N/A  Wound Consult Placed N/A  RN Wound Prevention Protocol Ordered Yes

## 2025-03-31 ENCOUNTER — APPOINTMENT (OUTPATIENT)
Dept: URBAN - METROPOLITAN AREA CLINIC 35 | Facility: CLINIC | Age: 70
Setting detail: DERMATOLOGY
End: 2025-03-31

## 2025-03-31 DIAGNOSIS — L81.4 OTHER MELANIN HYPERPIGMENTATION: ICD-10-CM

## 2025-03-31 DIAGNOSIS — L57.0 ACTINIC KERATOSIS: ICD-10-CM

## 2025-03-31 DIAGNOSIS — D22 MELANOCYTIC NEVI: ICD-10-CM

## 2025-03-31 DIAGNOSIS — L82.1 OTHER SEBORRHEIC KERATOSIS: ICD-10-CM

## 2025-03-31 DIAGNOSIS — Z71.89 OTHER SPECIFIED COUNSELING: ICD-10-CM

## 2025-03-31 DIAGNOSIS — D18.0 HEMANGIOMA: ICD-10-CM

## 2025-03-31 PROBLEM — D22.5 MELANOCYTIC NEVI OF TRUNK: Status: ACTIVE | Noted: 2025-03-31

## 2025-03-31 PROBLEM — D18.01 HEMANGIOMA OF SKIN AND SUBCUTANEOUS TISSUE: Status: ACTIVE | Noted: 2025-03-31

## 2025-03-31 PROCEDURE — ? LIQUID NITROGEN

## 2025-03-31 PROCEDURE — ? SUNSCREEN RECOMMENDATIONS

## 2025-03-31 PROCEDURE — 17000 DESTRUCT PREMALG LESION: CPT

## 2025-03-31 PROCEDURE — 99213 OFFICE O/P EST LOW 20 MIN: CPT | Mod: 25

## 2025-03-31 PROCEDURE — ? COUNSELING

## 2025-03-31 PROCEDURE — 17003 DESTRUCT PREMALG LES 2-14: CPT

## 2025-03-31 ASSESSMENT — LOCATION ZONE DERM
LOCATION ZONE: FACE
LOCATION ZONE: TRUNK
LOCATION ZONE: HAND

## 2025-03-31 ASSESSMENT — LOCATION SIMPLE DESCRIPTION DERM
LOCATION SIMPLE: RIGHT CHEEK
LOCATION SIMPLE: ABDOMEN
LOCATION SIMPLE: LEFT UPPER BACK
LOCATION SIMPLE: RIGHT UPPER BACK
LOCATION SIMPLE: LEFT HAND
LOCATION SIMPLE: CHEST

## 2025-03-31 ASSESSMENT — LOCATION DETAILED DESCRIPTION DERM
LOCATION DETAILED: RIGHT MID-UPPER BACK
LOCATION DETAILED: RIGHT INFERIOR LATERAL MALAR CHEEK
LOCATION DETAILED: LEFT LATERAL UPPER BACK
LOCATION DETAILED: EPIGASTRIC SKIN
LOCATION DETAILED: RIGHT MEDIAL SUPERIOR CHEST
LOCATION DETAILED: LEFT RADIAL DORSAL HAND

## 2025-04-01 ENCOUNTER — RESULTS FOLLOW-UP (OUTPATIENT)
Dept: CARDIOLOGY | Facility: MEDICAL CENTER | Age: 70
End: 2025-04-01
Payer: MEDICARE

## 2025-04-01 ENCOUNTER — TELEPHONE (OUTPATIENT)
Dept: CARDIOLOGY | Facility: MEDICAL CENTER | Age: 70
End: 2025-04-01
Payer: MEDICARE

## 2025-04-01 NOTE — TELEPHONE ENCOUNTER
DEEPA EOS to 's nurse, Bonnie, on 4/1/2025    Preliminary findings:    8 episodes of SVT  with an avg rate of 127 bpm    Sinus Rhythm  with an avg rate of 68 bpm    Rare Supraventricular and Ventricular ectopics    Ventricular Trigeminy was present    6 patient events:  SR 77-88      To Patient- These findings are preliminary and HAVE NOT yet been reviewed by your provider. Please allow our team time to review these findings and someone from our office will contact you if any follow up is necessary.

## 2025-04-07 ENCOUNTER — HOSPITAL ENCOUNTER (OUTPATIENT)
Dept: RADIOLOGY | Facility: MEDICAL CENTER | Age: 70
End: 2025-04-07
Attending: FAMILY MEDICINE
Payer: MEDICARE

## 2025-04-07 DIAGNOSIS — N64.53 RETRACTION OF NIPPLE: ICD-10-CM

## 2025-04-07 PROCEDURE — 76642 ULTRASOUND BREAST LIMITED: CPT | Mod: LT

## 2025-04-07 PROCEDURE — G0279 TOMOSYNTHESIS, MAMMO: HCPCS

## 2025-06-18 ENCOUNTER — APPOINTMENT (OUTPATIENT)
Dept: URBAN - METROPOLITAN AREA CLINIC 20 | Facility: CLINIC | Age: 70
Setting detail: DERMATOLOGY
End: 2025-06-18

## 2025-06-18 DIAGNOSIS — L90.8 OTHER ATROPHIC DISORDERS OF SKIN: ICD-10-CM

## 2025-06-18 PROCEDURE — ? COSMETIC CONSULTATION: EMFACE

## 2025-06-18 PROCEDURE — ? RF MICRONEEDLING

## 2025-06-18 NOTE — PROCEDURE: RF MICRONEEDLING
Treatment Number (Optional): 0
Depth In Mm: 0.1
Location #3: Forehead
Indication: skin tightening and resurfacing
Laser: Endymed Intensif
Information: You must select either a Location or Location Override for the laser information to render in the note
Consent: Written consent obtained, risks reviewed including but not limited to pain, scarring, infection and incomplete improvement.  Patient understands the procedure is cosmetic in nature and will require out of pocket payment.
Indication: dyschromia
Topical Anesthesia Type: 23% lidocaine, 7% tetracaine
Post-Care Instructions: After the procedure, take precautions agains sun exposure. Do not apply sunscreen for 12 hours after the procedure. Do not apply make-up for 12 hours after the procedure. Avoid alcohol based toners for 10-14 days. After 2-3 days patients can return to their regular skin regimen.
Pre-Procedure Text: The treatment areas were cleansed in the usual fashion and treatment proceeded as outlined above.
Depth In Mm: 1.5
Location #1: Chin
Laser: vEangelista Pixel8-
Laser Override (Optional): Exion
Detail Level: Simple
Location Override (Optional): full face
Anesthesia Type: 1% lidocaine with epinephrine

## 2025-06-26 ENCOUNTER — HOSPITAL ENCOUNTER (OUTPATIENT)
Dept: LAB | Facility: MEDICAL CENTER | Age: 70
End: 2025-06-26
Attending: FAMILY MEDICINE
Payer: MEDICARE

## 2025-06-26 LAB
BASOPHILS # BLD AUTO: 1.3 % (ref 0–1.8)
BASOPHILS # BLD: 0.09 K/UL (ref 0–0.12)
EOSINOPHIL # BLD AUTO: 0.62 K/UL (ref 0–0.51)
EOSINOPHIL NFR BLD: 8.8 % (ref 0–6.9)
ERYTHROCYTE [DISTWIDTH] IN BLOOD BY AUTOMATED COUNT: 46.5 FL (ref 35.9–50)
FERRITIN SERPL-MCNC: 258 NG/ML (ref 10–291)
HCT VFR BLD AUTO: 46.1 % (ref 37–47)
HGB BLD-MCNC: 15.6 G/DL (ref 12–16)
IMM GRANULOCYTES # BLD AUTO: 0.01 K/UL (ref 0–0.11)
IMM GRANULOCYTES NFR BLD AUTO: 0.1 % (ref 0–0.9)
IRON SATN MFR SERPL: 30 % (ref 15–55)
IRON SERPL-MCNC: 97 UG/DL (ref 40–170)
LYMPHOCYTES # BLD AUTO: 2.47 K/UL (ref 1–4.8)
LYMPHOCYTES NFR BLD: 34.9 % (ref 22–41)
MCH RBC QN AUTO: 29.8 PG (ref 27–33)
MCHC RBC AUTO-ENTMCNC: 33.8 G/DL (ref 32.2–35.5)
MCV RBC AUTO: 88 FL (ref 81.4–97.8)
MONOCYTES # BLD AUTO: 0.5 K/UL (ref 0–0.85)
MONOCYTES NFR BLD AUTO: 7.1 % (ref 0–13.4)
NEUTROPHILS # BLD AUTO: 3.38 K/UL (ref 1.82–7.42)
NEUTROPHILS NFR BLD: 47.8 % (ref 44–72)
NRBC # BLD AUTO: 0 K/UL
NRBC BLD-RTO: 0 /100 WBC (ref 0–0.2)
PLATELET # BLD AUTO: 275 K/UL (ref 164–446)
PMV BLD AUTO: 10.2 FL (ref 9–12.9)
RBC # BLD AUTO: 5.24 M/UL (ref 4.2–5.4)
TIBC SERPL-MCNC: 324 UG/DL (ref 250–450)
UIBC SERPL-MCNC: 227 UG/DL (ref 110–370)
WBC # BLD AUTO: 7.1 K/UL (ref 4.8–10.8)

## 2025-06-26 PROCEDURE — 85025 COMPLETE CBC W/AUTO DIFF WBC: CPT

## 2025-06-26 PROCEDURE — 82728 ASSAY OF FERRITIN: CPT

## 2025-06-26 PROCEDURE — 83550 IRON BINDING TEST: CPT

## 2025-06-26 PROCEDURE — 83540 ASSAY OF IRON: CPT

## 2025-06-26 PROCEDURE — 36415 COLL VENOUS BLD VENIPUNCTURE: CPT
